# Patient Record
Sex: MALE | Race: WHITE
[De-identification: names, ages, dates, MRNs, and addresses within clinical notes are randomized per-mention and may not be internally consistent; named-entity substitution may affect disease eponyms.]

---

## 2015-12-17 VITALS
SYSTOLIC BLOOD PRESSURE: 114 MMHG | DIASTOLIC BLOOD PRESSURE: 59 MMHG | DIASTOLIC BLOOD PRESSURE: 59 MMHG | SYSTOLIC BLOOD PRESSURE: 114 MMHG

## 2017-04-06 ENCOUNTER — HOSPITAL ENCOUNTER (OUTPATIENT)
Dept: HOSPITAL 61 - KCIC MRI | Age: 62
Discharge: HOME | End: 2017-04-06
Attending: NEUROLOGICAL SURGERY
Payer: COMMERCIAL

## 2017-04-06 DIAGNOSIS — M48.06: Primary | ICD-10-CM

## 2017-04-06 DIAGNOSIS — M54.06: ICD-10-CM

## 2017-04-06 DIAGNOSIS — M51.37: ICD-10-CM

## 2017-04-06 DIAGNOSIS — M41.86: ICD-10-CM

## 2017-04-06 PROCEDURE — 72158 MRI LUMBAR SPINE W/O & W/DYE: CPT

## 2017-04-06 PROCEDURE — 72110 X-RAY EXAM L-2 SPINE 4/>VWS: CPT

## 2017-04-06 NOTE — [facility]
PROCEDURE 

Lumbar radiographs. 

 

HISTORY 

Lumbar stenosis, radiculopathy, previous surgeries 

 

COMPARISON 

March 7, 2016 

 

FINDINGS 

Five views of lumbar spine to include flexion, neutral, and extension 

lateral radiographs are submitted. As seen previously, there has been 

posterolateral fusion at L4-5, intact bilateral pedicle screws attached to

vertical rods, L4-5 interbody spacer also present. There is again mild to 

moderate levoscoliosis centered upon the mid lumbar spine. Lumbar 

vertebral body stature is unchanged. There is advanced degenerative disc 

disease L5-S1 and L2-3, to a lesser degree at L3-4. There is multilevel 

lumbar spondylosis. There is again grade 1 anterior spondylolisthesis at 

L4-5, somewhat reduced with extension compared with flexion. There is 

minimal posterior subluxation L2 relative to L3, very slightly decreased 

with flexion. There is multilevel lumbar facet degenerative change. 

 

IMPRESSION 

1. There is intact posterolateral fusion hardware L4-5. Grade 1 anterior 

spondylolisthesis at L4-5 somewhat reduces with extension compared with 

flexion. Minimal posterior subluxation L2 relative to L3 slightly 

decreases with flexion. There is again advanced degenerative disc disease 

L5-S1 and L2-3 and to lesser degree L3-4. There is multilevel spondylosis.

There is lumbar levoscoliosis.

 

 

 

Electronically signed by: Félix Bowling MD (Apr 06, 2017 12:29:43)

## 2017-04-06 NOTE — [facility]
PROCEDURE 

MRI lumbar spine without and with contrast. 

 

HISTORY 

Lumbar stenosis, radiculopathy, previous surgeries, low back pain, 

symptoms for about 2 months 

 

TECHNIQUE 

Multiplanar, multi sequential pre and post contrast MR imaging was 

performed of the lumbar spine. 

Contrast: 12 cc Gadavist 

 

COMPARISON 

November 19, 2015 

 

FINDINGS 

There is some motion degradation. There is again grade 1 anterior 

spondylolisthesis at what is considered L4-5, negligible posterior 

subluxation L2 relative to L3. Conus terminates at L1. There is no nodular

enhancement of the conus or cauda equina. There has been interval 

posterolateral fusion at L4-L5, bilateral pedicle screws attached to 

vertical rods and also interbody graft at L4-5. Exam does not accurately 

evaluate integrity of hardware. Interbody fusion at L4-5 is not apparent. 

There is again advanced degenerative disc disease at L5-S1 and L2-3, to a 

lesser degree L3-4 and minimally at L1-2. There is mild levoscoliosis 

centered about L2-3. 

Not fully included, there is a large exophytic focus of signal abnormality

arising from the left kidney up to at least 8.5 cm. 

L1-L2: There is again negligible disc osteophyte complex and bulge. There 

is mild facet hypertrophic change. Spinal canal and neural foramina are 

adequate. 

L2-3: There is again disc osteophyte complex and bulge superimposed on the

posteriorly subluxed L2 vertebral body margin. There is indentation upon 

the ventral thecal sac greater in the right lateral recess as seen 

previously. There is again mild to moderate facet degenerative change 

greater on the right, also buckling of the ligamentum flavum greater on 

the right. There is again moderate narrowing of the far right lateral 

recess, mild narrowing of the far left lateral recess. There is contact of

the descending right L3 nerve root in the right lateral recess. There is 

mild left and mild to moderate right neural foramina compromise. 

L3-4: There is again right laminectomy defect. There is buckling of the 

ligamentum flavum on the left. There is very minimal disc osteophyte 

complex and shallow central protrusion as seen previously. There is very 

mild narrowing of the far lateral recesses seen previously. There is very 

mild narrowing of the left neural foramen, right neural foramen adequate. 

L4-5: There is facet hypertrophic change. Spinal canal is adequate. There 

is again right laminectomy defect. Accurate evaluation of the neural 

foramina is limited due to artifact from the hardware specially on the 

right. Left neural foramina is adequate, likely at least mild to moderate 

narrowing of the right neural foramen although poorly characterized due to

artifact. 

L5-S1: There is mild bilateral facet hypertrophic change. There is again 

posterior protrusion greatest centrally without significant neural 

impingement. There is no new significant displacement of the descending S1

nerve roots. Spinal canal is overall adequate. Neural foramina are overall

adequate. 

 

IMPRESSION 

1. Comparing with the November 2015 exam, there has been posterolateral 

fusion at L4-5 at which there is grade 1 anterior spondylolisthesis. There

is similar moderate right and mild left lateral recess stenosis at L2-3, 

contact of the descending right L3 nerve root. There is again advanced 

degenerative disc disease at L2-3 and L5-S1 and to a lesser degree at 

other levels.

2. There is neural foramina compromise as stated greatest on the right at 

L4-5 and L2-3.

3. There is lumbar levoscoliosis.

4. Not fully included, there is a large exophytic focus of signal 

abnormality arising from the left kidney, statistically most likely a cyst

although would be more definitively characterized with ultrasound.

 

 

 

Electronically signed by: Félix Bowling MD (Apr 06, 2017 12:01:37)

## 2017-05-17 ENCOUNTER — HOSPITAL ENCOUNTER (OUTPATIENT)
Dept: HOSPITAL 61 - SURGPAT | Age: 62
Discharge: HOME | End: 2017-05-17
Attending: NEUROLOGICAL SURGERY
Payer: COMMERCIAL

## 2017-05-17 DIAGNOSIS — Z01.818: Primary | ICD-10-CM

## 2017-05-17 LAB
ALBUMIN SERPL-MCNC: 3.6 G/DL (ref 3.4–5)
ALBUMIN/GLOB SERPL: 0.9 {RATIO} (ref 1–1.7)
ALP SERPL-CCNC: 55 U/L (ref 46–116)
ALT SERPL-CCNC: 28 U/L (ref 16–63)
ANION GAP SERPL CALC-SCNC: 5 MMOL/L (ref 6–14)
APTT BLD: 26 SEC (ref 24–38)
AST SERPL-CCNC: 16 U/L (ref 15–37)
BASOPHILS # BLD AUTO: 0.1 X10^3/UL (ref 0–0.2)
BASOPHILS NFR BLD: 1 % (ref 0–3)
BILIRUB SERPL-MCNC: 0.5 MG/DL (ref 0.2–1)
BUN SERPL-MCNC: 14 MG/DL (ref 8–26)
BUN/CREAT SERPL: 16 (ref 6–20)
CALCIUM SERPL-MCNC: 9.5 MG/DL (ref 8.5–10.1)
CHLORIDE SERPL-SCNC: 105 MMOL/L (ref 98–107)
CO2 SERPL-SCNC: 32 MMOL/L (ref 21–32)
CREAT SERPL-MCNC: 0.9 MG/DL (ref 0.7–1.3)
EOSINOPHIL NFR BLD: 4 % (ref 0–3)
ERYTHROCYTE [DISTWIDTH] IN BLOOD BY AUTOMATED COUNT: 13.6 % (ref 11.5–14.5)
GFR SERPLBLD BASED ON 1.73 SQ M-ARVRAT: 85.5 ML/MIN
GLOBULIN SER-MCNC: 3.8 G/DL (ref 2.2–3.8)
GLUCOSE SERPL-MCNC: 123 MG/DL (ref 70–99)
HCT VFR BLD CALC: 41.3 % (ref 39–53)
HGB BLD-MCNC: 14.2 G/DL (ref 13–17.5)
INR PPP: 1.2 (ref 0.8–1.1)
LYMPHOCYTES # BLD: 2.4 X10^3/UL (ref 1–4.8)
LYMPHOCYTES NFR BLD AUTO: 35 % (ref 24–48)
MCH RBC QN AUTO: 31 PG (ref 25–35)
MCHC RBC AUTO-ENTMCNC: 34 G/DL (ref 31–37)
MCV RBC AUTO: 89 FL (ref 79–100)
MONOCYTES NFR BLD: 13 % (ref 0–9)
NEUTROPHILS NFR BLD AUTO: 47 % (ref 31–73)
PLATELET # BLD AUTO: 195 X10^3/UL (ref 140–400)
POTASSIUM SERPL-SCNC: 4.2 MMOL/L (ref 3.5–5.1)
PROT SERPL-MCNC: 7.4 G/DL (ref 6.4–8.2)
PROTHROMBIN TIME: 14 SEC (ref 11.7–14)
RBC # BLD AUTO: 4.64 X10^6/UL (ref 4.3–5.7)
SODIUM SERPL-SCNC: 142 MMOL/L (ref 136–145)
WBC # BLD AUTO: 7 X10^3/UL (ref 4–11)

## 2017-05-17 PROCEDURE — 85027 COMPLETE CBC AUTOMATED: CPT

## 2017-05-17 PROCEDURE — 80053 COMPREHEN METABOLIC PANEL: CPT

## 2017-05-17 PROCEDURE — 85730 THROMBOPLASTIN TIME PARTIAL: CPT

## 2017-05-17 PROCEDURE — 85610 PROTHROMBIN TIME: CPT

## 2017-05-17 PROCEDURE — 93005 ELECTROCARDIOGRAM TRACING: CPT

## 2017-05-17 PROCEDURE — 87641 MR-STAPH DNA AMP PROBE: CPT

## 2017-05-17 PROCEDURE — 71020: CPT

## 2017-05-17 PROCEDURE — 36415 COLL VENOUS BLD VENIPUNCTURE: CPT

## 2017-05-17 NOTE — EKG
[facility]

               [address] [place] [address]

Test Date:    2017               Test Time:    14:50:44

Pat Name:     KENDY ALEX           Department:   

Patient ID:   [id number]           Room:          

Gender:       M                        Technician:   VAZQUEZ

:          1955               Requested By: LEILA LAYNE

Order Number: [id number]            Reading MD:     

                                 Measurements

Intervals                              Axis          

Rate:         66                       P:            47

DE:           210                      QRS:          12

QRSD:         86                       T:            24

QT:           398                                    

QTc:          419                                    

                           Interpretive Statements

SINUS RHYTHM

VENTRICULAR PREMATURE COMPLEX(ES)

ABNORMAL ECG

RI6.01

Compared to ECG 2015 14:10:28

First degree AV block no longer present

## 2017-05-17 NOTE — RAD
Chest, 2 views, 5/17/2017:



History: Preop evaluation for lumbar surgery.



The heart size and pulmonary vascularity are normal. No pulmonary infiltrates

are seen. There is no evidence of pleural fluid. Moderate hypertrophic

spurring is present in the spine. A surgical plate and screws is evident in

the lower cervical region.



IMPRESSION: No acute cardiopulmonary abnormality is detected.

## 2017-05-18 NOTE — HP
ADMIT DATE:  05/24/2017



DATE OF SURGERY:  05/24/2017



HISTORY OF PRESENT ILLNESS:  The patient is a pleasant 61-year-old who has

undergone 2 surgeries of the lumbar spine as well as 2 previous cervical

surgeries.  Currently, he is experiencing low back pain.  He underwent surgery

on his lumbar spine most recently in 12/2015.  Over the last 6 months, though he

has had increasing lower back pain.  The pain radiates from his back into his

posterior thigh and leg on the right side.  He also notices difficulty with

standing for any length of time because of increased pain and feelings of

weakness in both of his legs.  He rates his pain as a 5 out of 10.  Lying down,

standing, walking, sitting in a recliner, and bending makes the problems worse. 

Sitting in a straight back chair helps.  He has difficulty sleeping at night and

frequently must get up at night and sit in a straight back chair to help his

back pain.  He takes tramadol, Nucynta, and Norco.  He has had epidural steroid

injections without benefit.



PAST MEDICAL HISTORY:  Arthritis, artificial joints in bilateral knees,

diabetes, head/neck injury, hypertension, and kidney stones.



PAST SURGICAL HISTORY:  Gastric sleeve bariatric surgery in 2013, total right

knee replacement in 2012, ACDF in 2005, total knee replacement in 2004,

bilateral clavicle reductions in 1990, lumbar microdecompression at L3-L4, L4-L5

in December 2014, and lumbar fusion at L4-L5 in December 2015.



SOCIAL HISTORY:  Retired.  .  Exercises daily.  Quit smoking greater than

one year ago.  He drinks alcohol one to two times per month.



ALLERGIES:  MORPHINE.



CURRENT MEDICATIONS:  Requip, atorvastatin, Proscar, trazodone, ____ ,

omeprazole, fluticasone propionate, Zyrtec, Tylenol, gabapentin, metoprolol,

tramadol, Nucynta, and Norco.



REVIEW OF SYSTEMS:  A 12-point review of systems was obtained and is

noncontributory except that mentioned above.



PHYSICAL EXAMINATION:

NEUROSURGERY EXAMINATION:

GENERAL APPEARANCE:  Alert, pleasant, no acute distress.

HEAD:  Normocephalic, atraumatic.

SKIN:  Warm and dry.  Well-healed lumbar incisions.

MUSCULOSKELETAL:  Lumbar paraspinal muscle bulk is normal, restricted range of

motion of the lumbar spine, marked tenderness of lower lumbar spine with

palpation, and normal range of motion of the lower extremities bilaterally.

EXTREMITIES:  No clubbing, cyanosis, or edema.

NEUROLOGIC:  Alert and oriented x3, normal recent and remote memory, strength

5/5 in bilateral lower extremities, sensory was intact to light touch in the

lower extremities bilaterally, reflexes were absent in bilateral lower

extremities, negative straight leg raising bilaterally, antalgic gait.



IMAGING REVIEWED:  I reviewed a lumbar MRI scan as well as flexion and extension

x-rays.  On the flexion and extension films, there is motion at L4-L5 where he

has undergone a previous instrumentation.  On the MRI scan, he does have

significant spurring above the L4-L5 level and has mild scoliosis centered at

L2-L3.  Additionally, at L2-L3, there is disk osteophyte complex which markedly

narrows the lateral recess in right side of the canal and is associated with

moderately severe spinal stenosis.



ASSESSMENT:

1.  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

2.  Spinal stenosis, lumbar region.

3.  Radiculopathy, lumbar region.



PLAN:  He has had pseudoarthrosis at L4-L5 and will require removal and

replacement of hardware and revision of his fusion.  In the setting, I would

perform microdecompression surgery at L2-L3 on the right.  I discussed all of

this with him in detail including the risks and expected postoperative course. 

He understands and would like to proceed.  We will make the arrangements.

 



______________________________

LEILA LAYNE MD



DR:  JEAN-PAUL/keara  JOB#:  [id number] / [id number]

DD:  05/18/2017 15:01  DT:  05/18/2017 19:23

## 2017-05-24 ENCOUNTER — HOSPITAL ENCOUNTER (INPATIENT)
Dept: HOSPITAL 61 - OPSVCIP | Age: 62
LOS: 1 days | Discharge: HOME | DRG: 460 | End: 2017-05-25
Attending: NEUROLOGICAL SURGERY | Admitting: NEUROLOGICAL SURGERY
Payer: COMMERCIAL

## 2017-05-24 VITALS — DIASTOLIC BLOOD PRESSURE: 72 MMHG | SYSTOLIC BLOOD PRESSURE: 124 MMHG

## 2017-05-24 VITALS — WEIGHT: 240 LBS | HEIGHT: 63 IN | BODY MASS INDEX: 42.52 KG/M2

## 2017-05-24 VITALS — DIASTOLIC BLOOD PRESSURE: 84 MMHG | SYSTOLIC BLOOD PRESSURE: 184 MMHG

## 2017-05-24 VITALS — DIASTOLIC BLOOD PRESSURE: 78 MMHG | SYSTOLIC BLOOD PRESSURE: 176 MMHG

## 2017-05-24 VITALS — DIASTOLIC BLOOD PRESSURE: 74 MMHG | SYSTOLIC BLOOD PRESSURE: 126 MMHG

## 2017-05-24 VITALS — DIASTOLIC BLOOD PRESSURE: 66 MMHG | SYSTOLIC BLOOD PRESSURE: 130 MMHG

## 2017-05-24 VITALS — SYSTOLIC BLOOD PRESSURE: 123 MMHG | DIASTOLIC BLOOD PRESSURE: 76 MMHG

## 2017-05-24 VITALS — DIASTOLIC BLOOD PRESSURE: 71 MMHG | SYSTOLIC BLOOD PRESSURE: 120 MMHG

## 2017-05-24 VITALS — DIASTOLIC BLOOD PRESSURE: 69 MMHG | SYSTOLIC BLOOD PRESSURE: 124 MMHG

## 2017-05-24 VITALS — DIASTOLIC BLOOD PRESSURE: 85 MMHG | SYSTOLIC BLOOD PRESSURE: 122 MMHG

## 2017-05-24 VITALS — SYSTOLIC BLOOD PRESSURE: 128 MMHG | DIASTOLIC BLOOD PRESSURE: 63 MMHG

## 2017-05-24 DIAGNOSIS — M43.16: ICD-10-CM

## 2017-05-24 DIAGNOSIS — Z98.84: ICD-10-CM

## 2017-05-24 DIAGNOSIS — Z87.442: ICD-10-CM

## 2017-05-24 DIAGNOSIS — Z96.651: ICD-10-CM

## 2017-05-24 DIAGNOSIS — M96.0: ICD-10-CM

## 2017-05-24 DIAGNOSIS — M48.06: Primary | ICD-10-CM

## 2017-05-24 DIAGNOSIS — Z87.891: ICD-10-CM

## 2017-05-24 DIAGNOSIS — M51.16: ICD-10-CM

## 2017-05-24 DIAGNOSIS — M19.90: ICD-10-CM

## 2017-05-24 DIAGNOSIS — M54.16: ICD-10-CM

## 2017-05-24 DIAGNOSIS — I10: ICD-10-CM

## 2017-05-24 DIAGNOSIS — E11.9: ICD-10-CM

## 2017-05-24 PROCEDURE — 86901 BLOOD TYPING SEROLOGIC RH(D): CPT

## 2017-05-24 PROCEDURE — 86900 BLOOD TYPING SEROLOGIC ABO: CPT

## 2017-05-24 PROCEDURE — 0SG00K1 FUSION OF LUMBAR VERTEBRAL JOINT WITH NONAUTOLOGOUS TISSUE SUBSTITUTE, POSTERIOR APPROACH, POSTERIOR COLUMN, OPEN APPROACH: ICD-10-PCS | Performed by: NEUROLOGICAL SURGERY

## 2017-05-24 PROCEDURE — 72131 CT LUMBAR SPINE W/O DYE: CPT

## 2017-05-24 PROCEDURE — 36415 COLL VENOUS BLD VENIPUNCTURE: CPT

## 2017-05-24 PROCEDURE — 76000 FLUOROSCOPY <1 HR PHYS/QHP: CPT

## 2017-05-24 PROCEDURE — C1713 ANCHOR/SCREW BN/BN,TIS/BN: HCPCS

## 2017-05-24 PROCEDURE — 86850 RBC ANTIBODY SCREEN: CPT

## 2017-05-24 PROCEDURE — 0SB20ZZ EXCISION OF LUMBAR VERTEBRAL DISC, OPEN APPROACH: ICD-10-PCS | Performed by: NEUROLOGICAL SURGERY

## 2017-05-24 PROCEDURE — 99406 BEHAV CHNG SMOKING 3-10 MIN: CPT

## 2017-05-24 RX ADMIN — METHOCARBAMOL SCH MG: 750 TABLET ORAL at 21:13

## 2017-05-24 RX ADMIN — DOCUSATE SODIUM SCH MG: 100 CAPSULE, LIQUID FILLED ORAL at 21:13

## 2017-05-24 RX ADMIN — FENTANYL CITRATE PRN MCG: 50 INJECTION INTRAMUSCULAR; INTRAVENOUS at 17:05

## 2017-05-24 RX ADMIN — ROPINIROLE HYDROCHLORIDE SCH MG: 1 TABLET, FILM COATED ORAL at 21:13

## 2017-05-24 RX ADMIN — GABAPENTIN SCH MG: 300 CAPSULE ORAL at 21:13

## 2017-05-24 RX ADMIN — FENTANYL CITRATE PRN MCG: 50 INJECTION INTRAMUSCULAR; INTRAVENOUS at 16:41

## 2017-05-24 RX ADMIN — DOCUSATE SODIUM SCH MG: 100 CAPSULE, LIQUID FILLED ORAL at 21:00

## 2017-05-24 RX ADMIN — OXYCODONE HYDROCHLORIDE AND ACETAMINOPHEN PRN TAB: 5; 325 TABLET ORAL at 19:43

## 2017-05-24 NOTE — RAD
CT of the lumbar spine without contrast, 5/24/2017:



History: Radiculopathy, pseudarthrosis, stenosis, brain lab study



Noncontrast scans were obtained with multiplanar reconstructions produced. The

data was transferred to the operating room to aid in the patient's

stereotactically guided surgery. The following findings are delineated:



1. There is a mild left convexity lumbar scoliosis. There is considerable

degenerative disc disease throughout the lumbar spine. There are bilateral

pedicle screws at L4 and L5 attached to posterior fixation rods. There is a

partially radiopaque disc spacer at the L4-5 level. Associated artifacts

degrade image quality in the lower lumbar spine.



2. There is a mild spondylolisthesis at L4-5. There has been a laminectomy on

the right. The thecal sac at this level is poorly defined due to artifacts.

There appears be mild narrowing of the central spinal canal as well as

moderate right foraminal encroachment.



3. At L1-2 there are mild degenerative changes involving the facet joints.

There is disc space narrowing with mild posterior spurring and disc bulging.

The central spinal canal and neural foramina are fairly well-maintained.



4. At L2-3 there is severe degenerative disc disease with prominent posterior

spurs. There is widening of the facet joints with a mild reverse

spondylolisthesis. The combination of findings is causing mild to moderate

central spinal stenosis and moderate right foraminal encroachment.



5. At L3-4 there is moderate disc space narrowing and posterior marginal

spurring. A laminectomy defect is present on the right. The thecal sac is not

clearly defined due to artifacts. There appears to be mild narrowing of the

left side of the thecal sac at this level and mild left foraminal

encroachment.



6. At L5-S1 there is moderate disc space narrowing and posterior marginal

spurring, more so on the left. There is mild associated encroachment upon the

left lateral recess of S1. There is mild to moderate bilateral foraminal

encroachment. There are mild degenerative changes involving the facet joints.











PQRS Compliance Statement:



One or more of the following individualized dose reduction techniques were

utilized for this examination:

1. Automated exposure control

2. Adjustment of the mA and/or kV according to patient size

3. Use of iterative reconstruction technique

## 2017-05-24 NOTE — HP
ADMIT DATE:  05/24/2017



DATE OF SURGERY:  05/24/2017



HISTORY OF PRESENT ILLNESS:  The patient is a pleasant 61-year-old who has

undergone 2 surgeries of the lumbar spine as well as 2 previous cervical

surgeries.  Currently, he is experiencing low back pain.  He underwent surgery

on his lumbar spine most recently in 12/2015.  Over the last 6 months, though he

has had increasing lower back pain.  The pain radiates from his back into his

posterior thigh and leg on the right side.  He also notices difficulty with

standing for any length of time because of increased pain and feelings of

weakness in both of his legs.  He rates his pain as a 5 out of 10.  Lying down,

standing, walking, sitting in a recliner, and bending makes the problems worse. 

Sitting in a straight back chair helps.  He has difficulty sleeping at night and

frequently must get up at night and sit in a straight back chair to help his

back pain.  He takes tramadol, Nucynta, and Norco.  He has had epidural steroid

injections without benefit.



PAST MEDICAL HISTORY:  Arthritis, artificial joints in bilateral knees,

diabetes, head/neck injury, hypertension, and kidney stones.



PAST SURGICAL HISTORY:  Gastric sleeve bariatric surgery in 2013, total right

knee replacement in 2012, ACDF in 2005, total knee replacement in 2004,

bilateral clavicle reductions in 1990, lumbar microdecompression at L3-L4, L4-L5

in December 2014, and lumbar fusion at L4-L5 in December 2015.



SOCIAL HISTORY:  Retired.  .  Exercises daily.  Quit smoking greater than

one year ago.  He drinks alcohol one to two times per month.



ALLERGIES:  MORPHINE.



CURRENT MEDICATIONS:  Requip, atorvastatin, Proscar, trazodone, ____ ,

omeprazole, fluticasone propionate, Zyrtec, Tylenol, gabapentin, metoprolol,

tramadol, Nucynta, and Norco.



REVIEW OF SYSTEMS:  A 12-point review of systems was obtained and is

noncontributory except that mentioned above.



PHYSICAL EXAMINATION:

NEUROSURGERY EXAMINATION:

GENERAL APPEARANCE:  Alert, pleasant, no acute distress.

HEAD:  Normocephalic, atraumatic.

SKIN:  Warm and dry.  Well-healed lumbar incisions.

MUSCULOSKELETAL:  Lumbar paraspinal muscle bulk is normal, restricted range of

motion of the lumbar spine, marked tenderness of lower lumbar spine with

palpation, and normal range of motion of the lower extremities bilaterally.

EXTREMITIES:  No clubbing, cyanosis, or edema.

NEUROLOGIC:  Alert and oriented x3, normal recent and remote memory, strength

5/5 in bilateral lower extremities, sensory was intact to light touch in the

lower extremities bilaterally, reflexes were absent in bilateral lower

extremities, negative straight leg raising bilaterally, antalgic gait.



IMAGING REVIEWED:  I reviewed a lumbar MRI scan as well as flexion and extension

x-rays.  On the flexion and extension films, there is motion at L4-L5 where he

has undergone a previous instrumentation.  On the MRI scan, he does have

significant spurring above the L4-L5 level and has mild scoliosis centered at

L2-L3.  Additionally, at L2-L3, there is disk osteophyte complex which markedly

narrows the lateral recess in right side of the canal and is associated with

moderately severe spinal stenosis.



ASSESSMENT:

1.  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

2.  Spinal stenosis, lumbar region.

3.  Radiculopathy, lumbar region.



PLAN:  He has had pseudoarthrosis at L4-L5 and will require removal and

replacement of hardware and revision of his fusion.  In the setting, I would

perform microdecompression surgery at L2-L3 on the right.  I discussed all of

this with him in detail including the risks and expected postoperative course. 

He understands and would like to proceed.  We will make the arrangements.

 



______________________________

LEILA LAYNE MD



DR:  JEAN-PAUL/keara  JOB#:  [id number] / [id number]

DD:  05/18/2017 15:01  DT:  05/18/2017 19:23

## 2017-05-25 VITALS — SYSTOLIC BLOOD PRESSURE: 118 MMHG | DIASTOLIC BLOOD PRESSURE: 74 MMHG

## 2017-05-25 VITALS
DIASTOLIC BLOOD PRESSURE: 69 MMHG | SYSTOLIC BLOOD PRESSURE: 128 MMHG | SYSTOLIC BLOOD PRESSURE: 128 MMHG | DIASTOLIC BLOOD PRESSURE: 69 MMHG | SYSTOLIC BLOOD PRESSURE: 128 MMHG | DIASTOLIC BLOOD PRESSURE: 69 MMHG

## 2017-05-25 VITALS — DIASTOLIC BLOOD PRESSURE: 69 MMHG | SYSTOLIC BLOOD PRESSURE: 128 MMHG

## 2017-05-25 VITALS — DIASTOLIC BLOOD PRESSURE: 68 MMHG | SYSTOLIC BLOOD PRESSURE: 118 MMHG

## 2017-05-25 RX ADMIN — ROPINIROLE HYDROCHLORIDE SCH MG: 1 TABLET, FILM COATED ORAL at 07:58

## 2017-05-25 RX ADMIN — DOCUSATE SODIUM SCH MG: 100 CAPSULE, LIQUID FILLED ORAL at 07:59

## 2017-05-25 RX ADMIN — METHOCARBAMOL SCH MG: 750 TABLET ORAL at 07:58

## 2017-05-25 RX ADMIN — OXYCODONE HYDROCHLORIDE AND ACETAMINOPHEN PRN TAB: 5; 325 TABLET ORAL at 11:54

## 2017-05-25 RX ADMIN — GABAPENTIN SCH MG: 300 CAPSULE ORAL at 07:57

## 2017-05-25 RX ADMIN — OXYCODONE HYDROCHLORIDE AND ACETAMINOPHEN PRN TAB: 5; 325 TABLET ORAL at 08:08

## 2017-05-25 NOTE — DISCH
DISCHARGE INSTRUCTIONS


Condition on Discharge


Condition on Discharge:  Stable





Activity After Discharge


Activity Instructions for Disc:  Activity as tolerated, Avoid exertion


Bathing Instructions:  Shower-keep dressing dry


Lifting Instructions after Dis:  No heavy lifting, No pulling or pushing, Do 

not lift >10 pounds


Driving Instructions after Dis:  No driving for 2 weeks





Diet after Discharge


Additional Diet Restrictions:  resume home diet





Wound Incision Care


Wound/Incision Care:  Ice to area for comfort


Other wound/incision instructi:  may remove dressing in 48hrs if dry then may 

shower- no soaking





Contacting the  after DC


Call your doctor for:  Concerns you may have





Follow-Up


Follow up with:  Dr. Layne's nurse [phone]











LEILA LAYNE MD May 25, 2017 11:17

## 2017-05-25 NOTE — PDOC
PROGRESS NOTES


Subjective


Subjective


POD #1


up in chair


feels great


leg pain gone


wants to go home





Objective


Objective





Vital Signs








  Date Time  Temp Pulse Resp B/P (MAP) Pulse Ox O2 Delivery O2 Flow Rate FiO2


 


5/25/17 11:17 98.3 78 18 128/69 (88) 93 Room Air  





 98.3       


 


5/24/17 20:45       10.0 














Intake and Output 


 


 5/25/17





 07:00


 


Intake Total 3380 ml


 


Output Total 1900 ml


 


Balance 1480 ml


 


 


 


Intake Oral 1180 ml


 


IV Total 2200 ml


 


Output Urine Total 1650 ml


 


Estimated Blood Loss 250 ml


 


# Voids 2











Physical Exam


General:  Alert, Oriented X3, Cooperative, No acute distress


MUSCULOSKELETAL:  Other (SOSA)


Neuro:  Normal speech


Skin:  Other (dressing C,D,I, flat)





Plan


Plan of Care


dc home


f/u 2 weeks





Comment


Review of Relevant


I have reviewed the following items [place] (where applicable) has been applied.


Medications





Current Medications


Bacitracin 71834 unit/Sodium Chloride 1,000 ml @  1,000 mls/hr 1X PERIOP  ONCE 

IRR  Last administered on 5/24/17at 11:44;  Start 5/24/17 at 06:00;  Stop 5/24/ 17 at 06:59;  Status DC


Ondansetron HCl (Zofran) 4 mg PRN Q6HRS  PRN IV NAUSEA/VOMITING;  Start 5/24/17 

at 07:00;  Stop 5/25/17 at 06:59;  Status DC


Fentanyl Citrate (Fentanyl 2ml Vial) 25 mcg PRN Q5MIN  PRN IV MILD PAIN;  Start 

5/24/17 at 07:00;  Stop 5/25/17 at 06:59;  Status DC


Fentanyl Citrate (Fentanyl 2ml Vial) 50 mcg PRN Q5MIN  PRN IV MODERATE PAIN 

Last administered on 5/24/17at 17:05;  Start 5/24/17 at 07:00;  Stop 5/25/17 at 

06:59;  Status DC


Ringer's Solution 1,000 ml @  30 mls/hr Q24H IV ;  Start 5/24/17 at 07:00;  

Stop 5/24/17 at 18:59;  Status DC


Lidocaine HCl 2 ml PRN 1X  PRN ID PRIOR TO IV START;  Start 5/24/17 at 07:00;  

Stop 5/25/17 at 06:59;  Status DC


Prochlorperazine Edisylate (Compazine) 5 mg PACU PRN  PRN IV NAUSEA, MRX1;  

Start 5/24/17 at 07:00;  Stop 5/25/17 at 06:59;  Status DC


Cefazolin Sodium/ Dextrose 50 ml @  100 mls/hr 1X PREOP  PRN IV Prior to 

surgery Last administered on 5/24/17at 11:32;  Start 5/24/17 at 08:00


Bupivacaine HCl/ Epinephrine Bitart (Sensorcain-Mpf Epi 0.5%-1:[id number]) 30 ml STK

-MED ONCE .ROUTE  Last administered on 5/24/17at 11:44;  Start 5/24/17 at 07:20

;  Stop 5/24/17 at 07:21;  Status DC


Gelatin (Gelfoam  Size 100) 1 each STK-MED ONCE .ROUTE  Last administered on 5/ 24/17at 11:44;  Start 5/24/17 at 07:20;  Stop 5/24/17 at 07:21;  Status DC


Ketorolac Tromethamine (Toradol For Or Only) 60 mg STK-MED ONCE .ROUTE  Last 

administered on 5/24/17at 11:44;  Start 5/24/17 at 07:21;  Stop 5/24/17 at 07:22

;  Status DC


Thrombin 20,000 unit STK-MED ONCE TP  Last administered on 5/24/17at 11:44;  

Start 5/24/17 at 07:21;  Stop 5/24/17 at 07:22;  Status DC


Lidocaine HCl (Lidocaine Pf 2% Vial) 5 ml STK-MED ONCE .ROUTE ;  Start 5/24/17 

at 10:26;  Stop 5/24/17 at 10:27;  Status DC


Propofol 20 ml @ As Directed STK-MED ONCE IV ;  Start 5/24/17 at 10:26;  Stop 5/ 24/17 at 10:27;  Status DC


Midazolam HCl (Versed) 2 mg STK-MED ONCE .ROUTE ;  Start 5/24/17 at 10:26;  

Stop 5/24/17 at 10:27;  Status DC


Fentanyl Citrate (Fentanyl 2ml Vial) 100 mcg STK-MED ONCE .ROUTE ;  Start 5/24/ 17 at 10:26;  Stop 5/24/17 at 10:27;  Status DC


Rocuronium Bromide (Zemuron) 50 mg STK-MED ONCE .ROUTE ;  Start 5/24/17 at 10:26

;  Stop 5/24/17 at 10:27;  Status DC


Multi-Ingred Cream/Lotion/Oil/ Oint (Artificial Tears Eye Oint) 7 kevin STK-MED 

ONCE .ROUTE ;  Start 5/24/17 at 10:34;  Stop 5/24/17 at 10:35;  Status DC


Remifentanil HCl (Ultiva) 2 mg STK-MED ONCE IV ;  Start 5/24/17 at 10:44;  Stop 

5/24/17 at 10:45;  Status DC


Propofol 50 ml @ As Directed STK-MED ONCE IV ;  Start 5/24/17 at 10:44;  Stop 5/ 24/17 at 10:45;  Status DC


Ondansetron HCl (Zofran) 4 mg STK-MED ONCE .ROUTE ;  Start 5/24/17 at 11:03;  

Stop 5/24/17 at 11:04;  Status DC


Dexamethasone Sodium Phosphate (Decadron) 20 mg STK-MED ONCE .ROUTE ;  Start 5/ 24/17 at 11:03;  Stop 5/24/17 at 11:04;  Status DC


Ephedrine Sulfate 50 mg STK-MED ONCE IV ;  Start 5/24/17 at 11:31;  Stop 5/24/ 17 at 11:32;  Status DC


Desflurane (Suprane) 90 ml STK-MED ONCE IH ;  Start 5/24/17 at 12:17;  Stop 5/24 /17 at 12:18;  Status DC


Phenylephrine HCl 1 mg STK-MED ONCE IV ;  Start 5/24/17 at 12:51;  Stop 5/24/17 

at 12:52;  Status DC


Glycopyrrolate (Robinul) 1 mg STK-MED ONCE .ROUTE ;  Start 5/24/17 at 12:53;  

Stop 5/24/17 at 12:54;  Status DC


Propofol 50 ml @ As Directed STK-MED ONCE IV ;  Start 5/24/17 at 13:10;  Stop 5/ 24/17 at 13:11;  Status DC


Remifentanil HCl (Ultiva) 1 mg STK-MED ONCE IV ;  Start 5/24/17 at 13:50;  Stop 

5/24/17 at 13:51;  Status DC


Remifentanil HCl (Ultiva) 1 mg STK-MED ONCE IV ;  Start 5/24/17 at 14:59;  Stop 

5/24/17 at 15:00;  Status DC


Aspirin (Melissa Aspirin) 325 mg DAILY PO  Last administered on 5/25/17at 07:59;  

Start 5/25/17 at 09:00


Atorvastatin Calcium (Lipitor) 20 mg HS PO  Last administered on 5/24/17at 21:13

;  Start 5/24/17 at 21:00


Docusate Sodium (Colace) 100 mg BID PO  Last administered on 5/25/17at 07:59;  

Start 5/24/17 at 21:00


Docusate Sodium (Colace) 100 mg BID PO  Last administered on 5/25/17at 07:59;  

Start 5/24/17 at 21:00


Finasteride (Proscar) 5 mg DAILY PO ;  Start 5/25/17 at 09:00


Lisinopril (Prinivil) 20 mg DAILY PO ;  Start 5/25/17 at 09:00


Methocarbamol (Robaxin) 750 mg TID PO  Last administered on 5/25/17at 07:58;  

Start 5/24/17 at 21:00


Metoprolol Succinate (Toprol Xl) 25 mg DAILY PO  Last administered on 5/25/17at 

08:01;  Start 5/25/17 at 09:00


Tramadol HCl (Ultram) 50 mg PRN Q6HRS  PRN PO PAIN;  Start 5/24/17 at 15:30


Calcium/Vitamin D (Oscal D 500mg/ 200uts) 1 tab DAILYWBKFT PO  Last 

administered on 5/25/17at 07:59;  Start 5/25/17 at 08:00


Gabapentin (Neurontin) 300 mg TID PO  Last administered on 5/25/17at 07:57;  

Start 5/24/17 at 21:00


Ropinirole HCl (Requip) 2 mg BID PO  Last administered on 5/25/17at 07:58;  

Start 5/24/17 at 21:00


Trazodone HCl (Desyrel) 150 mg HS PO  Last administered on 5/24/17at 21:13;  

Start 5/24/17 at 21:00


Cefazolin Sodium/ Dextrose 50 ml @  100 mls/hr 1X  ONCE IV ;  Start 5/24/17 at 

15:30;  Stop 5/24/17 at 17:31;  Status DC


Acetaminophen (Tylenol) 650 mg PRN Q6HRS  PRN PO MILD PAIN / TEMP;  Start 5/24/ 17 at 15:30


Al Hydroxide/Mg Hydroxide (Mylanta Plus Xs) 30 ml PRN Q3HRS  PRN PO HEARTBURN / 

GAS;  Start 5/24/17 at 15:30


Calcium Carbonate/ Glycine (Tums) 500 mg PRN Q3HRS  PRN PO INDIGESTION;  Start 5 /24/17 at 15:30


Diphenhydramine HCl (Benadryl) 25 mg PRN Q6HRS  PRN PO ITCHING;  Start 5/24/17 

at 15:30


Diphenhydramine HCl (Benadryl) 25 mg PRN Q6HRS  PRN IV ITCHING;  Start 5/24/17 

at 15:30


Sodium Chloride (Normal Saline Flush) 3 ml QSHIFT  PRN IV AFTER MEDS AND BLOOD 

DRAWS;  Start 5/24/17 at 15:30


Potassium Chloride/Sodium Chloride 1,000 ml @  75 mls/hr [id number] IV ;  Start 5/ 24/17 at 15:30


Oxycodone/ Acetaminophen (Percocet 5/325) 1 tab PRN Q4HRS  PRN PO MILD PAIN, 

1ST CHOICE Last administered on 5/25/17at 08:08;  Start 5/24/17 at 15:30


Oxycodone/ Acetaminophen (Percocet 5/325) 2 tab PRN Q4HRS  PRN PO MODERATE PAIN

, SEVERE PAIN Last administered on 5/25/17at 00:45;  Start 5/24/17 at 15:30


Magnesium Hydroxide (Milk Of Magnesia) 2,400 mg PRN Q12HR  PRN PO CONSTIPATION;

  Start 5/24/17 at 15:30


Ondansetron HCl (Zofran) 4 mg PRN Q6HRS  PRN IV NAUESA, 1ST CHOICE;  Start 5/24/ 17 at 15:30


Cefazolin Sodium 1 gm/Sodium Chloride 50 ml @  100 mls/hr Q8HRS IV  Last 

administered on 5/25/17at 06:01;  Start 5/24/17 at 22:00;  Stop 5/25/17 at 14:29


Fentanyl Citrate (Fentanyl 2ml Vial) 50 mcg PRN Q2HR  PRN IV PAIN;  Start 5/24/ 17 at 15:30


Cefazolin Sodium/ Dextrose (Ancef 2gm Premix) 2 gm STK-MED ONCE IV ;  Start 5/24 /17 at 15:00;  Stop 5/25/17 at 09:03;  Status DC





Active Scripts


Active


Colace (Docusate Sodium) 100 Mg Capsule 100 Mg PO BID


Colace (Docusate Sodium) 100 Mg Capsule 100 Mg PO BID


Reported


Tums (Calcium Carbonate) 300 Mg Tab.chew 300 Mg PO 


Tramadol Hcl 50 Mg Tablet 50 Mg PO Q6H PRN


Lisinopril 20 Mg Tablet 20 Mg PO DAILY


Hydrocodone-Apap 7.5-325  ** (Hydrocodone Bit/Acetaminophen) 1 Each Tablet 1 

Tab PO PRN Q6HRS PRN


Robaxin-750 (Methocarbamol) 750 Mg Tablet 1 Tab PO TID


Calcium + D3 Er Tablet (Calcium Carb & Cit/Vitamin D3) 1 Each Tablet.er 1 Each 

PO DAILY


     LAST DOSE GIVEN:


     DATE:12-17-15


     TIME:9:00 a.m.


     NEXT DOSE DUE:


     DATE:12-18-15


     TIME:9:00 a.m.


Gabapentin 300 Mg Capsule 300 Mg PO TID


     LAST DOSE GIVEN:


     DATE:12-17-15


     TIME:9:00 a.m.


     NEXT DOSE DUE:


     DATE:12-17-15


     TIME:2:00 p.m.


Toprol Xl (Metoprolol Succinate) 25 Mg Tab.er.24h 25 Mg PO DAILY


     Not taken while in hosp. May resume at home as directed.


Aspirin 325 Mg Tablet 1 Tab PO DAILY


     LAST DOSE GIVEN:


     DATE:12-17-15


     TIME:9:00 a.m.


     NEXT DOSE DUE:


     DATE:12-18-15


     TIME:9:00 a.m.


Requip (Ropinirole Hcl) 2 Mg Tablet 2 Mg PO BID


     LAST DOSE GIVEN:


     DATE:12-17-15


     TIME:9:00 a.m.


     NEXT DOSE DUE:


     DATE:12-17-15


     TIME:9:00 p.m.


Trazodone Hcl 150 Mg Tablet 1 Tab PO QHS


     LAST DOSE GIVEN:


     DATE:12-16-15


     TIME:9:00 p.m.


     NEXT DOSE DUE:


     DATE:12-17-15


     TIME:9:00 p.m.


Atorvastatin Calcium 20 Mg Tablet 20 Mg PO HS


     LAST DOSE GIVEN:


     DATE:12-16-15


     TIME:9:00 p.m.


     NEXT DOSE DUE:


     DATE:12-17-15


     TIME:9:00 p.m.


Proscar (Finasteride) 5 Mg Tablet 1 Tab PO DAILY


     LAST DOSE GIVEN:


     DATE:12-16-15


     TIME:9:00 p.m.


     NEXT DOSE DUE:


     DATE:12-17-15


     TIME:9:00 p.m.


Vitals/I & O





Vital Sign - Last 24 Hours








 5/24/17 5/24/17 5/24/17 5/24/17





 16:22 16:22 16:37 16:41


 


Temp  96.9  





  96.9  


 


Pulse  96 87 


 


Resp  20 20 20


 


B/P (MAP)  129/40 126/58 


 


Pulse Ox  100 95 99


 


O2 Delivery Mask  Simple Mask Aerosol Mask


 


O2 Flow Rate 10 10 10 10.0


 


    





    





 5/24/17 5/24/17 5/24/17 5/24/17





 17:00 17:05 17:15 17:30


 


Temp 97.0  97.0 98.2





 97.0  97.0 98.2


 


Pulse 88  96 83


 


Resp 20 20 20 18


 


B/P (MAP) 136/64  136/37 128/63 (84)


 


Pulse Ox 96 99 96 97


 


O2 Delivery Room Air Room Air Room Air Room Air


 


    





    





 5/24/17 5/24/17 5/24/17 5/24/17





 17:45 17:52 18:03 18:15


 


Temp    98.3





    98.3


 


Pulse 80  78 74


 


Resp   20 18


 


B/P (MAP) 130/66 (87)  124/69 (87) 120/71 (87)


 


Pulse Ox   97 98


 


O2 Delivery  Room Air Room Air Room Air


 


    





    





 5/24/17 5/24/17 5/24/17 5/24/17





 18:44 19:15 19:43 19:45


 


Pulse 70 91  87


 


Resp 20 18 18 18


 


B/P (MAP) 124/72 (89) 176/78 (110)  184/84 (117)


 


Pulse Ox  96  96


 


O2 Delivery Room Air Room Air Room Air Room Air





 5/24/17 5/24/17 5/24/17 5/24/17





 20:00 20:45 20:45 21:45


 


Pulse   90 85


 


Resp   18 


 


B/P (MAP)   123/76 (92) 122/85 (97)


 


Pulse Ox  96 96 99


 


O2 Delivery Room Air Room Air Room Air Room Air


 


O2 Flow Rate 10.0 10.0  





 5/24/17 5/25/17 5/25/17 5/25/17





 23:10 00:45 01:45 03:00


 


Temp 97.8   97.8





 97.8   97.8


 


Pulse 85   75


 


Resp 16 18 18 18


 


B/P (MAP) 126/74 (91)   118/74 (89)


 


Pulse Ox 94 94 94 96


 


O2 Delivery Room Air BiPAP/CPAP Room Air BiPAP/CPAP


 


    





    





 5/25/17 5/25/17 5/25/17 5/25/17





 06:03 08:01 08:08 08:15


 


Temp 97.8   





 97.8   


 


Pulse 77 91  


 


Resp 18  18 


 


B/P (MAP) 118/68 (85) 121/70  


 


Pulse Ox 98   


 


O2 Delivery BiPAP/CPAP   Room Air


 


    





    





 5/25/17   





 11:17   


 


Temp 98.3   





 98.3   


 


Pulse 78   


 


Resp 18   


 


B/P (MAP) 128/69 (88)   


 


Pulse Ox 93   


 


O2 Delivery Room Air   














Intake and Output   


 


 5/24/17 5/24/17 5/25/17





 15:00 23:00 07:00


 


Intake Total 50 ml 2530 ml 800 ml


 


Output Total  1550 ml 350 ml


 


Balance 50 ml 980 ml 450 ml

















LEILA LAYNE MD May 25, 2017 11:20

## 2017-05-26 NOTE — OP
DATE OF SURGERY:  05/24/2017



PREOPERATIVE DIAGNOSES:

1.  Spondylolisthesis L4-L5 with pseudoarthrosis L4-L5.

2.  Lateral recess stenosis with herniated lumbar disk, L2-L3 right with right

lumbar radiculopathy.



POSTOPERATIVE DIAGNOSES:

1.  Spondylolisthesis L4-L5 with pseudoarthrosis L4-L5.

2.  Lateral recess stenosis with herniated lumbar disk, L2-L3 right with right

lumbar radiculopathy.



OPERATION PERFORMED:

1.  Removal of hardware, L4-L5; posterolateral fusion, L4-L5 with allograft

bone; revision of hardware L4-L5 with re-tapping larger diameter screws and

construct.

2.  Hemilaminotomy with decompression of dura and nerve root and lumbar

microdiskectomy at L2-3, right.  The operation was done with stimulated EMG

monitoring, fluoroscopy, microscopic dissection and BrainLAB guidance.



OPERATIVE INDICATIONS:  The patient is a very pleasant 61-year-old man who

developed back pain along with pain, which was severe, which has radiated into

his right lower extremity.  On imaging studies, he had the above-mentioned

findings.  I recommended revision of his spondylolisthesis with removal and

replacement of hardware combined with a hemilaminotomy and microdiskectomy at

L2-L3, right.  He understood the surgery, the risks, technique and the expected

postoperative course and wished to go ahead.



DESCRIPTION OF PROCEDURE:  Following general endotracheal anesthesia, the

patient was positioned prone on the Chaka table.  His lumbar region was

prepped and draped in the standard fashion.  CHRISSY hose and AV impulse boots were

applied for DVT prophylaxis.  A microscope was draped.  Fluoroscopy was draped

and brought into field.  Monitoring was established.  Ancef 2 gram was given

less than 1 hour prior to initiation of surgery.  Iliac pins were placed in the

left iliac crest and the BrainLAB system was initialized.  I then reopened his

previous lumbar incision, dissected down through skin and subcutaneous tissue,

reflected the skin and subcutaneous tissue and then made an opening in the

lumbar dorsal fashion directly over the hardware at L4-L5 first on the right

side ____ exposed the hardware.  I removed the nuts and then removed the screws

and cornelius assembly on this side, I felt that the fusion was satisfactory, although

tenuous.  I did excoriate the bone and over the transverse processes at L4 and

L5 on the right and then aspirated 20 mL of bone marrow.  I used this and mixed

this with allograft bone, which was then packed into the left lateral gutter. 

The screws were placed and the cornelius was placed, and the system was torqued.  I

then went to the right side in a similar fashion.  Passed on to the lumbodorsal

fascia and exposed the previous hardware.  The nuts were removed and the

interconnecting cornelius was removed.  Clearly, there was a pseudoarthrosis on this

side and I removed the screws.  The L4 screw on the right was largely rounded

out and I tapped and placed a 7.5 screw in this location.  The remaining screws

I placed were 6.5 diameter screws and I used the [employer] system.  I did

excoriate the bone and transverse processes and placed allograft bone in the

lateral gutter after it had been adequately exposed and excoriated.  I then

placed the screws and cornelius assembly and torqued the system.  I then moved

superiorly to L2-L3 on the right and placed a [place] micro disk retractor after

create an exposure, there was considerable scarring present.  I brought in the

microscope and the remainder of surgery done with the microscope using

microscopic technique.  I burred down a generous hemilaminotomy, trimmed away

thickened ligamentum flavum, performed a partial foraminotomy.  The root was

tethered and scarred down on to a herniated lumbar disk and I freed much of

this; however, the disk was heavily calcified except for the superior portion,

which was ____ I made a small incision in the ____ and performed a diskectomy

with micropituitary.  As I worked and fully decompress the region, the nerve

became very free.  I did perform a partial foraminotomy.  At this point, I feel

I had an excellent decompression.  I did use small amounts of bone wax as well

as bipolar cautery for any bleeding.  I removed the retractor, obtained

hemostasis in the muscle, I irrigated the entire wound copiously.  Then, I

closed the wound in layers with absorbable suture and the skin was closed with

skin staples.  The operation went very well and the patient was awakened

uneventfully, taken to recovery room in excellent condition.  I was quite

pleased with the surgery.

 



______________________________

LEILA LAYNE MD



DR:  JEAN-PAUL/keara  JOB#:  [id number] / [id number]

DD:  05/25/2017 19:53  DT:  05/26/2017 01:15

## 2017-08-14 ENCOUNTER — HOSPITAL ENCOUNTER (OUTPATIENT)
Dept: HOSPITAL 61 - KCIC | Age: 62
Discharge: HOME | End: 2017-08-14
Attending: NEUROLOGICAL SURGERY
Payer: COMMERCIAL

## 2017-08-14 DIAGNOSIS — M51.36: Primary | ICD-10-CM

## 2017-08-14 DIAGNOSIS — M41.86: ICD-10-CM

## 2017-08-14 PROCEDURE — 72100 X-RAY EXAM L-S SPINE 2/3 VWS: CPT

## 2017-08-14 NOTE — [facility]
Indication: Lumbar fusion.

 

Time of exam 12:35 PM

 

Comparison is made with prior lumbar radiographs from 4/6/2017.

 

There is mild left convexity lumbar scoliotic curvature. There is normal 

lordotic curvature. Postop changes of posterior instrumented fusion is 

noted with vertical stabilization rods and pedicle screws transfixing the 

L4-5 level. The hardware appears intact. No fracture or loosening is seen.

The vertebral body heights are maintained. No acute compression fracture 

is identified. There is multilevel degenerative disc disease with variable

disc space narrowing and marginal spurring.

 

IMPRESSION: Stable postop changes. No acute feature is detected.

 

Electronically signed by: Aidan Hernández MD (8/14/2017 12:53 PM) [id number]

## 2018-09-26 ENCOUNTER — HOSPITAL ENCOUNTER (OUTPATIENT)
Dept: HOSPITAL 63 - DXRAD | Age: 63
Discharge: HOME | End: 2018-09-26
Attending: PHYSICIAN ASSISTANT
Payer: COMMERCIAL

## 2018-09-26 DIAGNOSIS — M19.012: Primary | ICD-10-CM

## 2018-09-26 DIAGNOSIS — M95.4: ICD-10-CM

## 2018-09-26 PROCEDURE — 73030 X-RAY EXAM OF SHOULDER: CPT

## 2018-09-26 NOTE — RAD
Examination: SHOULDER 2+V LEFT

 

History: CHRONIC LEFT SHOULDER PAIN

 

Comparison/Correlation: None

 

Findings: Total of 3 images of the left shoulder were obtained. 

Acromioclavicular joint degenerative changes are present. Left 

glenohumeral relationship is unremarkable. Mild spurring of the bony 

glenoid evident. Left upper lung field is unremarkable.

 

Postoperative cervical spine fusion noted. Left second rib deformity which

may represent old fracture noted. Correlate with history. No acute or 

suspicious process.

 

Impression:

Mild degenerative changes of the left acromioclavicular joint. Mild 

spurring about the bony glenoid.

 

Electronically signed by: Loki Jimenez MD (9/26/2018 8:47 AM) [facility]

## 2019-01-18 ENCOUNTER — HOSPITAL ENCOUNTER (OUTPATIENT)
Dept: HOSPITAL 61 - KCIC MRI | Age: 64
Discharge: HOME | End: 2019-01-18
Attending: PHYSICIAN ASSISTANT
Payer: COMMERCIAL

## 2019-01-18 DIAGNOSIS — Z98.890: ICD-10-CM

## 2019-01-18 DIAGNOSIS — M47.898: ICD-10-CM

## 2019-01-18 DIAGNOSIS — M48.07: ICD-10-CM

## 2019-01-18 DIAGNOSIS — M48.061: ICD-10-CM

## 2019-01-18 DIAGNOSIS — M47.897: ICD-10-CM

## 2019-01-18 DIAGNOSIS — M51.36: Primary | ICD-10-CM

## 2019-01-18 PROCEDURE — A9585 GADOBUTROL INJECTION: HCPCS

## 2019-01-18 PROCEDURE — 72158 MRI LUMBAR SPINE W/O & W/DYE: CPT

## 2019-01-18 PROCEDURE — 72197 MRI PELVIS W/O & W/DYE: CPT

## 2019-01-18 NOTE — [facility]
MRI of the lumbar spine without and with contrast 1/18/2019

 

CLINICAL HISTORY: Low back pain with a history of previous lumbar spine 

surgery.

 

TECHNIQUE: Unenhanced T1-weighted and T2-weighted sagittal and axial and 

inversion recovery sagittal images of the lumbar spine were obtained. 

After the intravenous administration of 12 cc of Gadavist, enhanced 

T1-weighted sagittal and axial images of the lumbar spine were obtained.

 

FINDINGS: Mild S-shaped curvature of the thoracolumbar spine is seen. 

Degenerative signal changes and loss of height are seen involving all of 

the disks of the lumbar spine. Degenerative signal changes are seen within

the marrow surrounding these discs. The conus medullaris is normal 

morphology, position, and signal characteristics. Rounded high signal 

intensity lesions are seen involving both kidneys on the T2-weighted 

images, left greater than right. These measure 3 mm to 7 cm in greatest 

diameter. They likely represent cysts.

 

The patient is post posterolateral fusion using pedicle screws and 

stabilizing rods at L4-5.

 

On the axial images at the L1-2 disc space there is a mild generalized 

disc bulge. This is eccentric to the left. Degenerative changes are seen 

involving the facet joints bilaterally. These findings when combined do 

not result in significant central spinal canal or neural foraminal 

stenosis.

 

At the L2-3 disc space there is a moderate generalized disc bulge. The 

patient is post right lateral hemilaminotomy. Degenerative changes are 

seen involving the facet joints bilaterally. These findings when combine 

result in mild left-sided central spinal canal stenosis. Mild right 

greater than left neural foraminal stenosis is seen.

 

At the L3-4 disc space, the patient is post right hemilaminotomy. There is

a mild to moderate generalized disc bulge. This is eccentric to the left. 

Degenerative changes are seen involving the facet joints bilaterally. 

These findings result in mild to moderate left-sided central spinal canal 

stenosis. Mild left greater than right neural foraminal stenosis is seen.

 

At the L4-5 disc space there is a mild generalized disc bulge. This is 

eccentric to the right. Degenerative changes are seen involving the facet 

joints bilaterally. The patient is post right hemilaminotomy. These 

findings when combined do not result in significant central spinal canal 

stenosis. Mild to moderate right greater than left neural foraminal 

stenosis is seen.

 

At the L5-S1 disc space there is a moderate generalized disc bulge. This 

is eccentric to the left. Degenerative changes are seen involving the 

facet joints bilaterally. These findings do not result in significant 

central spinal canal stenosis. Mild to moderate left greater than right 

neural foraminal stenosis is seen.

 

IMPRESSION:

1. Postsurgical changes are seen as outlined above.

2. The changes of degenerative disc disease are seen throughout the lumbar

spine. These findings result in mild left-sided central spinal canal 

stenosis at L2-3 and mild to moderate left-sided central spinal canal 

stenosis at L3-4. Multilevel neural foraminal stenosis of varying severity

is seen as outlined above.

 

Electronically signed by: Abhi Ramos MD (1/18/2019 3:53 PM) [facility]

## 2019-01-18 NOTE — [facility]
MRI of the sacrum without and with contrast 1/18/2018

 

CLINICAL HISTORY: Bilateral hip pain with right SI joint pain.

 

TECHNIQUE: Unenhanced T1-weighted and fat-saturated T2-weighted sagittal, 

coronal oblique and axial images of the sacrum were obtained. After the 

intravenous administration of 12 cc Gadavist, enhanced T1-weighted 

sagittal and axial images were obtained.

 

FINDINGS: The marrow signal of the sacrum is within normal limits. No 

occult fracture is seen. The marrow signal of the coccyx is within normal 

limits. Degenerative signal changes are seen within the marrow surrounding

both SI joints. Mild degenerative changes are seen involving both SI 

joints. No ankylosis is seen. No area of abnormal contrast enhancement is 

seen.

 

IMPRESSION: Mild degenerative changes are seen involving both SI joints as

outlined above. Otherwise negative study.

 

Electronically signed by: Abhi Ramos MD (1/18/2019 5:25 PM) [facility]

## 2019-02-18 ENCOUNTER — HOSPITAL ENCOUNTER (OUTPATIENT)
Dept: HOSPITAL 61 - RAD | Age: 64
Discharge: HOME | End: 2019-02-18
Attending: NEUROLOGICAL SURGERY
Payer: COMMERCIAL

## 2019-02-18 VITALS
SYSTOLIC BLOOD PRESSURE: 167 MMHG | SYSTOLIC BLOOD PRESSURE: 167 MMHG | SYSTOLIC BLOOD PRESSURE: 167 MMHG | DIASTOLIC BLOOD PRESSURE: 71 MMHG | SYSTOLIC BLOOD PRESSURE: 167 MMHG | DIASTOLIC BLOOD PRESSURE: 71 MMHG | DIASTOLIC BLOOD PRESSURE: 71 MMHG | DIASTOLIC BLOOD PRESSURE: 71 MMHG | SYSTOLIC BLOOD PRESSURE: 167 MMHG | DIASTOLIC BLOOD PRESSURE: 71 MMHG

## 2019-02-18 VITALS — SYSTOLIC BLOOD PRESSURE: 158 MMHG | DIASTOLIC BLOOD PRESSURE: 89 MMHG

## 2019-02-18 DIAGNOSIS — M48.061: ICD-10-CM

## 2019-02-18 DIAGNOSIS — M43.16: ICD-10-CM

## 2019-02-18 DIAGNOSIS — M47.26: Primary | ICD-10-CM

## 2019-02-18 DIAGNOSIS — M48.07: ICD-10-CM

## 2019-02-18 DIAGNOSIS — M12.88: ICD-10-CM

## 2019-02-18 PROCEDURE — 72132 CT LUMBAR SPINE W/DYE: CPT

## 2019-02-18 PROCEDURE — 72265 MYELOGRAPHY L-S SPINE: CPT

## 2019-02-18 NOTE — NUR
Patient denies any complaint or issues after procedure. Discharge information on Myelogram 
discussed, patient and family verbalized understanding. Discharge information handed to 
patient along with CD of films from procedure today. Patient ambulated out of facility with 
family member without difficulty.

## 2019-02-18 NOTE — RAD
Lumbar myelogram, 2/18/2019:



History: Back and right leg pain, lumbar radiculopathy



Under local anesthesia, aseptic conditions and fluoroscopic guidance a lumbar

puncture was performed at the mid L2 level utilizing a 25-gauge Jackie

spinal needle.  Good clear CSF flow was obtained following which 14 cc of

Omnipaque 180 was injected into the thecal sac.  The spinal needle was then

removed and hemostasis obtained.  4.0 minutes of fluoroscopy time was

utilized.  15 fluoroscopic spot images were recorded.  The patient tolerated

the procedure well and was sent to CT in good condition.  The following

findings were delineated on the myelogram:



1.  There is a mild left convexity lumbar scoliosis with extensive

hypertrophic spurring at multiple levels.  Bilateral pedicle screws are

attached to longitudinally oriented posterior fixation rods at L4 and L5.  A

partially radiopaque disc spacers present at L4-5.

2.  There is minimal anterolisthesis at L4-5 which does not appear to change

significantly on upright lateral flexion and extension imaging.  No other

significant subluxation is evident.

3.  There is a prominent anterior extradural defect at L2-3 due to posterior

spurring which in conjunction with a mild posterior extradural defect is

causing moderate central spinal stenosis at that level in the upright

position.  The thecal sac measures 6 to 7 mm in AP diameter on these lateral

images.

4.  There is a moderate anterior extradural defect at L3-4 with mild

associated central spinal stenosis.

5.  There is a mild anterior extra defect at L1-2 without significant central

spinal stenosis.

6.  There is decreased opacification of the L3, L4 and L5 nerve root sleeves

bilaterally.







CT of the lumbar spine-post myelogram, 2/18/2018:



Multidetector CT imaging was performed with multiplanar reconstructions

produced.  The following findings are delineated:



1.  There is extensive anterior spurring in the upper lumbar spine.  At T12-L1

there is mild posterior marginal spurring.  The central spinal canal and

neural foramina are well maintained.



2.  At L1-2 there is mild posterior disc bulging and spurring.  There are mild

degenerative changes involving the facet joints.  The central spinal canal and

neural foramina are well maintained.



3.  At L2-3 there is severe disc space narrowing with a vacuum disc phenomena

and severe anterior and posterior spurring.  There is a partial laminectomy

defect on the right with absence of a portion of the right facet joint.  The

combination of findings is narrowing the thecal sac which measures 7 mm in AP

diameter at the midline.  There is moderate inferior bony foraminal narrowing

on the right at this level.



4.  At L3-4 there is also moderate disc space narrowing with a vacuum disc

phenomena and marginal spurring.    There is a laminectomy defect on the right

with absence of a portion of the right facet.  There is moderate facet joint

arthropathy on the left with posterior ligamentous thickening.  This causes

distortion and narrowing of the left side of the thecal sac at this level. 

There is moderate bilateral foraminal encroachment.



5.  At L4-5 the bilateral pedicle screws and rods appear intact.  There is

solid posterior fusion on the left.  There appears to be a laminectomy defect

on the right.  A partially radiopaque disc spacer is in place.  There is mild

anterolisthesis at L4-5.  The thecal sac in the central canal is not

significantly stenotic.  There is moderate right bony foraminal narrowing.



6.  At L5-S1 there is severe disc space narrowing with a vacuum disc phenomena

and prominent spurring both anteriorly and posteriorly.  The central spinal

canal is well maintained.  There is mild bilateral foraminal narrowing.



7.  Moderate aortoiliac calcific plaquing is present.





IMPRESSION:

1.  Multilevel postsurgical and moderate hypertrophic degenerative changes as

described above.

2.  Moderate central spinal stenosis at L2-3.

3.  Mild central spinal stenosis, predominantly on the left, at L3-4.

4.  Mild anterolisthesis at L4-5.





[employer] Compliance Statement:



One or more of the following individualized dose reduction techniques were

utilized for this examination:

1. Automated exposure control

2. Adjustment of the mA and/or kV according to patient size

3. Use of iterative reconstruction technique

## 2019-03-06 ENCOUNTER — HOSPITAL ENCOUNTER (OUTPATIENT)
Dept: HOSPITAL 61 - PNCL | Age: 64
Discharge: HOME | End: 2019-03-06
Attending: ANESTHESIOLOGY
Payer: COMMERCIAL

## 2019-03-06 DIAGNOSIS — I10: ICD-10-CM

## 2019-03-06 DIAGNOSIS — M19.90: ICD-10-CM

## 2019-03-06 DIAGNOSIS — M54.16: Primary | ICD-10-CM

## 2019-03-06 DIAGNOSIS — E11.9: ICD-10-CM

## 2019-03-06 DIAGNOSIS — M79.604: ICD-10-CM

## 2019-03-06 PROCEDURE — G0463 HOSPITAL OUTPT CLINIC VISIT: HCPCS

## 2019-03-07 NOTE — PAIN
DATE OF SERVICE:  03/06/2019



INITIAL CONSULTATION FOR PAIN CLINIC



CHIEF COMPLAINT:  Low back and right lower extremity pain.



HISTORY OF PRESENT ILLNESS:  This is a 63-year-old male who presents with

history of pain for many years, worse since August of this last year, pain in

the low back and right lower extremity, posterior gluteus,  posterior thigh,

lateral thigh and anterior thigh, status post multiple low back surgeries with

fusion.  The patient has recently seen his neurosurgeon.  He was not

recommending any further surgery at this time or interventions.  The patient

with significant pain in the low back, right leg.  As previously, the patient

reports it is constant, sharp, shooting, radiating, worse with walking,

standing, change in positions, waking him from sleep at night at least 3-4

times.  It does not affect his bowel or bladder control, but does affect his

ability to walk.  He is not using any assistive devices to ambulate.  The

patient did have a myelogram and an MRI scan of the lumbar spine dated

01/18/2019, which showed postsurgical changes, degenerative disk disease

throughout the lumbar spine, mild left-sided central spinal canal stenosis at

L2-L3, mild to moderate left-sided central spinal stenosis at L3-L4 and L4-L5

disk space with bulge eccentric to the right, post right hemilaminotomy as well

as post right hemilaminotomy at L3-L4.  The patient reports it is worse with

walking, standing, change in positions, getting up from seated position,

especially and standing for more than even 10-15 minutes.  The patient reports

he has had epidural injections in the past, chiropractic treatment, physical

therapy as recently as this year with chiropractic treatment none of which

helped significantly for long term.  The patient is taking gabapentin, Tylenol

PM as well as hydrocodone.  The hydrocodone does decrease the pain by about

30-40%, the others do not.  The patient reports no loss of motor function, has

significant fatigability of the right leg, even with walking more than about

10-15 minutes.



PAST MEDICAL HISTORY:  Significant for hypertension, type 2 diabetes, hearing

loss, arthritis, kidney stones.



PAST SURGICAL HISTORY:  Includes cataract extractions, weight loss surgery with

gastrectomy, bilateral total knee replacements, lumbar laminectomy with fusion,

cervical laminectomy and fusion, knee surgery bilaterally and a clavicle surgery

in the past.



CURRENT MEDICATIONS:  Include metoprolol, atorvastatin, Proscar, ropinirole,

hydrocodone, Tylenol, lisinopril and gabapentin.



ALLERGIES:  THE PATIENT IS ALLERGIC TO MORPHINE, WHICH CAUSES ITCHING.



FAMILY HISTORY:  Significant for lung cancer, colon cancer, high blood pressure

and arthritis.



SOCIAL HISTORY:  The patient does smoke less than a pack a day for the last 40

years.  Drinks alcohol about once a week.  He is not using any illegal, illicit

or recreational drugs.  The patient lives locally in [place], [place].  Reports

he is currently retired.



REVIEW OF SYSTEMS:  The patient's review of systems is positive for those items

mentioned in history of present illness.  All systems reviewed and otherwise

negative.  It is complete, full and well documented on the patient's chart.



PHYSICAL EXAMINATION:

VITAL SIGNS:  The patient's blood pressure is 173/106, pulse is 81, respirations

18, temperature is 97.9 degrees Fahrenheit.  Height is 5 feet 7 inches and

weight is 267 pounds. 

GENERAL:  The patient is awake, alert, oriented, appropriate, very pleasant

demeanor.

HEENT:  Head shows normocephalic, atraumatic.  Extraocular movements are intact

and symmetrical.  Oral cavity, mucous membranes moist and pink.  Dentition is

intact.

NECK:  Shows anterior throat supple without palpable lymphadenopathy noted. 

Swallow reflex symmetrical.

CHEST:  Shows normal on inspection.  Breath sounds are clear to auscultation

bilaterally.

HEART:  S1, S2 clear.  No murmurs auscultated.

ABDOMEN:  Soft, nontender, nondistended.  No palpable organomegaly is noted.  No

rebound or guarding.

BACK:  Shows spine grossly in the midline.  Normal appearing thoracic kyphosis,

very significant flattening of the lumbar lordotic curvature with well-healed

surgical scarring noted.  Lumbar paraspinous muscle shows symmetrical on

inspection, on palpation shows some moderate tenderness throughout the upper,

middle and lower distribution of paraspinous muscles with palpation, but only

diffusely without significant radiation, without atrophy or hypertrophy, without

trigger points.  No tenderness over the spinous processes, sacrum or sacroiliac

regions.  The patient has good rotational motion of the lumbar spine both

laterally greater than 10 degrees, right and left as well as extension, greater

than 10 degrees forward flexion, 45 degrees without significant pain reported.

EXTREMITIES:  Lower extremities show deep tendon reflexes at 1+ in the patellar

and tendo calcaneus tendons are equal.  Motor exam is 5/5 with dorsiflexion,

extension, quadriceps and hamstring flexion symmetrical and intact.  Peripheral

pulses are 1+ posterior tibial.  No peripheral edema is noted.  Lower

extremities are warm and dry to the touch, equal in color and appearance.  The

patient's straight leg raise is noted to be negative bilaterally.  Gaenslen's

and Rasheed's maneuvers are negative bilaterally as well.  The patient is able

to stand, has difficulty trying to stand on his toes as he does lose balance,

but is able to do this.  He is walking with a slight antalgic gait.  Does not

appear to favor the right lower extremity significantly, but does have a bit of

a wide stance gait and shuffles a bit.  He is not using any assistive devices to

ambulate.

SKIN:  Warm and dry.  Good turgor.  No edema.  No sores, rashes or bruising.



IMPRESSION:

1.  This is a 63-year-old male with long history of low back pain, right lower

extremity pain, now worse since past August.

2.  Recent consultation with neurosurgeon not recommending any further surgical

intervention.

3.  MRI scan and CT scan of the lumbar spine as noted.

4.  Arthritis.

5.  Hypertension.

6.  Type 2 diabetes.



PLAN:  Options were discussed with the patient including conservative medical

management, continued physical therapy, interventional techniques and he would

like to pursue interventional techniques.  We discussed spinal cord stimulator

as this was recommended through his neurosurgeon as well with no new surgical

findings or recommendations for intervention with surgical technique, the

patient would like to pursue this.  We discussed the stimulator in great detail

and gave the patient some information to look on his own by his request to do

some more research on this as well.  The patient will follow up once the

preauthorization is obtained for spinal cord stimulator temporary lead placement

in trial.  In the meantime, the patient will continue doing stretching and

strengthening exercises, take his medication as prescribed and we will follow up

once preauthorization is obtained for spinal cord stimulator trial temporary

lead replacement.

 



______________________________

JEN CASTRO MD



DR:  MADDIE/keara  JOB#:  [id number] / [id number]

DD:  03/06/2019 12:10  DT:  03/07/2019 02:12

## 2019-04-03 ENCOUNTER — HOSPITAL ENCOUNTER (OUTPATIENT)
Dept: HOSPITAL 61 - PNCL | Age: 64
Discharge: HOME | End: 2019-04-03
Attending: ANESTHESIOLOGY
Payer: COMMERCIAL

## 2019-04-03 DIAGNOSIS — M48.061: ICD-10-CM

## 2019-04-03 DIAGNOSIS — M96.1: ICD-10-CM

## 2019-04-03 DIAGNOSIS — M51.16: Primary | ICD-10-CM

## 2019-04-03 DIAGNOSIS — Z88.5: ICD-10-CM

## 2019-04-03 PROCEDURE — 63650 IMPLANT NEUROELECTRODES: CPT

## 2019-04-04 NOTE — PAIN
DATE OF SERVICE:  04/03/2019



DIAGNOSES:  Lumbar radiculopathy with lumbar degenerative disk disease, lumbar

spinal stenosis and post-lumbar laminectomy syndrome.



HISTORY OF PRESENT ILLNESS:  The patient is a 63-year-old male who returns for

followup status post initial evaluation and preauthorization for spinal cord

stimulator temporary lead placement, the patient obtained this, also psychiatric

evaluation which he was placed in a good category for implantable devices and

spinal surgery.  The patient would like to proceed.  The patient reports still

significant pain in low back, mostly in the right lower extremity, right

posterior gluteus, posterior thigh, posterior calf and lateral thigh as well and

some in the groin on the right side.  The patient reports it is 9 on a scale of

10 at its worst, 7 on average, 4 at its least and is 7 today.  The patient

reports a burning, aching, sharp, radiating, becoming more constant with

walking, standing, change in positions.  Again, the patient admitted to multiple

other modalities, physical therapies, interventional techniques with epidural

steroid injections, had back surgery as well multiple times with instrumentation

and fusion with still significant pain in the low back and right lower

extremity.  The patient reports no new motor or sensory deficits, no new bowel

or bladder incontinence.  Reports it was awakened from sleep at least 4 times at

night and his chief complaint is inability to sleep comfortably.



PHYSICAL EXAMINATION:

VITAL SIGNS:  Today, his blood pressure is 139/71, pulse is 87, respirations are

18, temperature is 98.0 degrees Fahrenheit.  Height 5 feet 8 inches, weight is

262 pounds.

GENERAL:  The patient is awake, alert, oriented, appropriate, very pleasant

demeanor.

HEENT:  Normocephalic, atraumatic.  Extraocular movements are intact and

symmetrical.  Oral cavity:  Mucous membranes moist and pink.  Dentition is

intact.

NECK:  Shows anterior throat supple without palpable lymphadenopathy noted. 

Swallow reflex symmetrical.

CHEST:  Shows normal on inspection.  Breath sounds clear to auscultation

bilaterally.

HEART:  Shows S1, S2 clear.  No murmurs auscultated.

ABDOMEN:  Soft, nontender, nondistended.  No palpable organomegaly is noted.  No

rebound or guarding demonstrated.

BACK:  Spine grossly in the midline.  Normal appearing thoracic kyphosis and

lumbar lordotic curvature is slightly flattened with well-healed surgical scar

noted.  Lumbar paraspinous muscle shows symmetrical on inspection; with

palpation shows some moderate tenderness throughout the upper, middle, lower

distribution of paraspinous muscles bilaterally, but only with deeper palpation.

 The patient shows no difficulty with range of motion or increased pain with

extension or flexion, but some limited rotation secondary to mechanical

inability.

EXTREMITIES:  The patient's lower extremities show deep tendon reflexes at 1+ in

the patellar and tendo calcaneus tendons.  Motor exam is strong with 5/5

dorsiflexion, extension, equal and symmetrical.  Peripheral pulses are 1+

posterior tibia.  No peripheral edema is noted bilaterally.



Options were discussed with the patient.  The patient's old chart was reviewed

as was his current medication regimen updated.  Current review of systems

updated today as well and we will proceed with a spinal cord stimulator

temporary lead placement x 2.  Risks were discussed including but not limited to

bleeding, infection, possibility of epidural hematoma, subsequent neurological

compromise, dural puncture, headaches, spinal cord and/or nerve damage as well

as exposure to fluoroscopy and poor results regarding pain control.  The patient

understands and wished to proceed.  He is to return to clinic in approximately 1

week for followup and removal of temporary leads and assessment of the pain

level at that time.



DIAGNOSES:  Lumbar radiculopathy with lumbar degenerative disk disease, lumbar

spinal stenosis and post-lumbar laminectomy syndrome.



PROCEDURE:  Spinal cord stimulator temporary lead placement x 2 using C-arm

fluoroscopic guidance under sterile prep and drape with a C-arm fluoroscopic

guidance with AP and lateral views.  Local anesthetic was used to anesthetize

the skin and in entrance of the spinal cord stimulator needles 14-gauge Hustead

with stylets with preservative-free normal saline and loss of resistance

technique was carried out both right and left of midline, first left, than right

with good preservative-free normal saline and loss of resistance technique. 

Negative aspiration was noted at each injection site.  Spinal cord stimulator

leads were then threaded into the epidural space under direct visual C-arm

fluoroscopy both AP and lateral views and without difficulty, without

resistance, without paresthesia to rest in the midline at first left paramedian

lead with the tip at the superior endplate of T8 and the second right

paramedian, the tip of the lead at the superior endplate of T9.



These were confirmed posterior in the epidural space with lateral visualization

of the fluoroscopy.  Needle was withdrawn.  Stylets were withdrawn.  The leads

were secured with suture and Tegaderm as well as reinforcement with sterile

tape.  Condition at discharge was stable.  The patient tolerated the procedure

well, had no complications, left on his own power with no immediate

complications noted.

 



______________________________

JEN CASTRO MD



DR:  MADDIE/keara  JOB#:  [id number] / [id number]

DD:  04/03/2019 14:15  DT:  04/04/2019 07:09

## 2019-04-10 ENCOUNTER — HOSPITAL ENCOUNTER (OUTPATIENT)
Dept: HOSPITAL 61 - PNCL | Age: 64
Discharge: HOME | End: 2019-04-10
Attending: ANESTHESIOLOGY
Payer: COMMERCIAL

## 2019-04-10 DIAGNOSIS — M96.1: ICD-10-CM

## 2019-04-10 DIAGNOSIS — M48.061: Primary | ICD-10-CM

## 2019-04-10 DIAGNOSIS — M51.16: ICD-10-CM

## 2019-04-10 PROCEDURE — G0463 HOSPITAL OUTPT CLINIC VISIT: HCPCS

## 2019-04-11 NOTE — PAIN
DATE OF SERVICE:  04/10/2019



DIAGNOSES:  Lumbar radiculopathy with lumbar spinal stenosis, lumbar

degenerative disk disease and post-lumbar laminectomy syndrome.



HISTORY OF PRESENT ILLNESS:  The patient is a 63-year-old male who returns for

followup status post spinal cord stimulator temporary leads placed one week ago.

 The patient returns today reporting very good relief with about 80-90%

improvement in his pain in his low back and right lower extremity.  Over the

past week, the patient is very pleased with his activity, has been increasing

his distance walking, doing working activities, household activities, working in

his workshop, sleeping better.  The patient reports he has better attitude and

is taking less pain medication as well as, significantly reduced by about 50% of

the amount.  The patient reports the pain is still in the low back, but is only

about 10-20% of what it normally is.  The patient reports it is burning pain,

mainly in the back and right leg.  The patient reports it is 2 on a scale of 10

at its worst, 2 on average and 0 at its least.  The patient reports it is 2

today.  The patient reports no new motor or sensory deficits, no new bowel or

bladder incontinence or other complaints.



PHYSICAL EXAMINATION:

VITAL SIGNS:  The patient's blood pressure is 139/80, pulse 80, respirations 16,

temperature 97.9 degrees Fahrenheit.

GENERAL:  The patient is awake, alert, oriented, appropriate, very pleasant

demeanor.

HEENT:  Head shows normocephalic, atraumatic.  Extraocular movements are intact

and symmetrical.  Oral cavity:  Mucous membranes are moist and pink.  Dentition

is intact.

NECK:  Shows anterior throat supple.

CHEST:  Shows breath sounds clear to auscultation bilaterally.

HEART:  Shows S1, S2 clear.

ABDOMEN:  Soft, nontender, nondistended.

BACK:  Shows spine grossly in the midline.  Lumbar paraspinous muscle shows

symmetrical on inspection.  The patient's bandages were taken down and

reinforcement tape over the spinal cord stimulator leads revealing leads, which

were clean and dry, no erythema, no tenderness, no discharge.  The patient's

leads were removed under sterile prep and drape in a sterile technique using a

suture removal kit.  Sutures were cut and the leads were removed x 2 with tips

intact.  Again the site clean and dry, no erythema, no tenderness or discharge. 

A sterile bandage was applied.



Options were discussed with the patient.  The patient's old chart was reviewed

as was his current medication regimen updated.  Current review of systems

updated today as well.  We will have the patient schedule for a permanent

implant of the spinal cord stimulator system.  The patient will continue with

strengthening and stretching exercises as well as exercise as tolerated in the

meantime and we will make the arrangements for permanent stimulator system to be

placed.

 



______________________________

JEN CASTRO MD



DR:  MADDIE/keara  JOB#:  [id number] / [id number]

DD:  04/10/2019 13:31  DT:  04/11/2019 03:50

## 2019-05-07 ENCOUNTER — HOSPITAL ENCOUNTER (OUTPATIENT)
Dept: HOSPITAL 61 - PNCL | Age: 64
Discharge: HOME | End: 2019-05-07
Attending: ANESTHESIOLOGY
Payer: COMMERCIAL

## 2019-05-07 DIAGNOSIS — M51.16: Primary | ICD-10-CM

## 2019-05-07 DIAGNOSIS — M96.1: ICD-10-CM

## 2019-05-07 DIAGNOSIS — M48.061: ICD-10-CM

## 2019-05-07 PROCEDURE — G0463 HOSPITAL OUTPT CLINIC VISIT: HCPCS

## 2019-05-07 NOTE — PAIN
DATE OF SERVICE:  05/07/2019



DIAGNOSES:  Lumbar radiculopathy with lumbar spinal stenosis, lumbar

degenerative disk disease and post-lumbar laminectomy syndrome.



HISTORY OF PRESENT ILLNESS:  The patient is a 63-year-old male who returns for

followup status post spinal cord stimulator trial with very good results.  We

had scheduled him for permanent implant.  However, he was having difficulty

finding a facility that was covered well with his insurance provider.  We had

discussed this with he, the referring surgeon as well as the spinal cord

stimulator representatives on trying to find a facility that would be less

expensive for him out of pocket; however, he is not able to do that.  The

patient returns today wishing to proceed with the implant.  He did very well

with about 90% plus improvement with his stimulator system on the temporary

leads and we will make arrangements to do that here at [facility]

in the near future.  The patient reports still doing well, still pain in the low

back, right lower extremity as it was prior to the stimulator trial, but again

about 90% improvement with the trial and the stimulator temporary leads.  The

patient reports the pain is aching, shooting, burning, constant in the low back,

right leg, posterior gluteus, posterior thigh, anterior thigh as well as the

groin on the right.  The patient reports it is an 8 on a scale of 10 at its

worst, 6 on average, 4 at its least and is 4 today.  With his these

hemodynamics, he was increasing his activity with greater distance walking,

sleeping better, driving car better, traveling better.  The patient reports no

new motor or sensory deficits, no new bowel or bladder incontinence or other

complaints.



PHYSICAL EXAMINATION:

VITAL SIGNS:  Today, the patient's blood pressure is 158/86, pulse 74,

respirations 18, temperature 97.6 degrees Fahrenheit.  Height 5 feet 7-1/2

inches and weight is 265 pounds.

GENERAL:  The patient is awake, alert, oriented, appropriate, very pleasant

demeanor.

HEENT:  Head is normocephalic, atraumatic.  Extraocular movements are intact,

symmetrical.  Oral cavity:  Mucous membranes moist and pink.  Dentition is

intact.

NECK:  Shows anterior throat supple without palpable lymphadenopathy noted. 

Swallow reflex is symmetrical.

CHEST:  Shows normal on inspection.  Breath sounds are clear to auscultation

bilaterally.

HEART:  Shows S1, S2 clear.  No murmurs auscultated.

ABDOMEN:  Soft, nontender, nondistended.  No palpable organomegaly is noted.  No

rebound or guarding demonstrated.

BACK:  Shows spine grossly in the midline.  Normal-appearing thoracic kyphosis

and lumbar lordotic curvature is slightly flattened with well-healed surgical

scar noted in the lumbar distribution.

EXTREMITIES:  The patient's lower extremities show deep tendon reflexes at 1+ in

the patellar and tendo-calcaneus tendons.  Motor exam is strong with 5/5

dorsiflexion, extension, quadriceps and hamstring flexion and symmetrical

bilaterally.  Peripheral pulses are 1+ posterior tibia.  No peripheral edema is

noted.



Options were discussed with the patient.  The patient's old chart was reviewed

as was his current medication regimen updated.  Current review of systems

updated today as well and we will proceed with scheduling for permanent implant

with several companies and we will make arrangements as the operating room

schedule allows.

 



______________________________

JEN CASTRO MD



DR:  MADDIE/keara  JOB#:  [id number] / [id number]

DD:  05/07/2019 09:12  DT:  05/07/2019 20:14

## 2019-05-31 ENCOUNTER — HOSPITAL ENCOUNTER (OUTPATIENT)
Dept: HOSPITAL 61 - SURG | Age: 64
Discharge: HOME | End: 2019-05-31
Attending: ANESTHESIOLOGY
Payer: COMMERCIAL

## 2019-05-31 VITALS
SYSTOLIC BLOOD PRESSURE: 147 MMHG | DIASTOLIC BLOOD PRESSURE: 72 MMHG | SYSTOLIC BLOOD PRESSURE: 147 MMHG | SYSTOLIC BLOOD PRESSURE: 147 MMHG | DIASTOLIC BLOOD PRESSURE: 72 MMHG | SYSTOLIC BLOOD PRESSURE: 147 MMHG | DIASTOLIC BLOOD PRESSURE: 72 MMHG | SYSTOLIC BLOOD PRESSURE: 147 MMHG | SYSTOLIC BLOOD PRESSURE: 147 MMHG | DIASTOLIC BLOOD PRESSURE: 72 MMHG | DIASTOLIC BLOOD PRESSURE: 72 MMHG | DIASTOLIC BLOOD PRESSURE: 72 MMHG

## 2019-05-31 VITALS — WEIGHT: 265 LBS | BODY MASS INDEX: 41.11 KG/M2 | HEIGHT: 67.5 IN

## 2019-05-31 DIAGNOSIS — M51.16: ICD-10-CM

## 2019-05-31 DIAGNOSIS — M96.1: Primary | ICD-10-CM

## 2019-05-31 DIAGNOSIS — M48.061: ICD-10-CM

## 2019-05-31 LAB
ANION GAP SERPL CALC-SCNC: 8 MMOL/L (ref 6–14)
BASOPHILS # BLD AUTO: 0 X10^3/UL (ref 0–0.2)
BASOPHILS NFR BLD: 1 % (ref 0–3)
BUN SERPL-MCNC: 10 MG/DL (ref 8–26)
CALCIUM SERPL-MCNC: 9.2 MG/DL (ref 8.5–10.1)
CHLORIDE SERPL-SCNC: 104 MMOL/L (ref 98–107)
CO2 SERPL-SCNC: 30 MMOL/L (ref 21–32)
CREAT SERPL-MCNC: 0.9 MG/DL (ref 0.7–1.3)
EOSINOPHIL NFR BLD: 0.2 X10^3/UL (ref 0–0.7)
EOSINOPHIL NFR BLD: 2 % (ref 0–3)
ERYTHROCYTE [DISTWIDTH] IN BLOOD BY AUTOMATED COUNT: 13 % (ref 11.5–14.5)
GFR SERPLBLD BASED ON 1.73 SQ M-ARVRAT: 85 ML/MIN
GLUCOSE SERPL-MCNC: 161 MG/DL (ref 70–99)
HCT VFR BLD CALC: 38.3 % (ref 39–53)
HGB BLD-MCNC: 12.9 G/DL (ref 13–17.5)
LYMPHOCYTES # BLD: 1.7 X10^3/UL (ref 1–4.8)
LYMPHOCYTES NFR BLD AUTO: 27 % (ref 24–48)
MCH RBC QN AUTO: 31 PG (ref 25–35)
MCHC RBC AUTO-ENTMCNC: 34 G/DL (ref 31–37)
MCV RBC AUTO: 91 FL (ref 79–100)
MONO #: 0.6 X10^3/UL (ref 0–1.1)
MONOCYTES NFR BLD: 10 % (ref 0–9)
NEUT #: 3.9 X10^3UL (ref 1.8–7.7)
NEUTROPHILS NFR BLD AUTO: 61 % (ref 31–73)
PLATELET # BLD AUTO: 165 X10^3/UL (ref 140–400)
POTASSIUM SERPL-SCNC: 4.1 MMOL/L (ref 3.5–5.1)
RBC # BLD AUTO: 4.22 X10^6/UL (ref 4.3–5.7)
SODIUM SERPL-SCNC: 142 MMOL/L (ref 136–145)
WBC # BLD AUTO: 6.5 X10^3/UL (ref 4–11)

## 2019-05-31 PROCEDURE — C1778 LEAD, NEUROSTIMULATOR: HCPCS

## 2019-05-31 PROCEDURE — A7015 AEROSOL MASK USED W NEBULIZE: HCPCS

## 2019-05-31 PROCEDURE — 76000 FLUOROSCOPY <1 HR PHYS/QHP: CPT

## 2019-05-31 PROCEDURE — 63685 INS/RPLC SPI NPG/RCVR POCKET: CPT

## 2019-05-31 PROCEDURE — 36415 COLL VENOUS BLD VENIPUNCTURE: CPT

## 2019-05-31 PROCEDURE — 85025 COMPLETE CBC W/AUTO DIFF WBC: CPT

## 2019-05-31 PROCEDURE — C1822 GEN, NEURO, HF, RECHG BAT: HCPCS

## 2019-05-31 PROCEDURE — 80048 BASIC METABOLIC PNL TOTAL CA: CPT

## 2019-05-31 NOTE — DISCH
DISCHARGE INSTRUCTIONS


Condition on Discharge


Condition on Discharge:  Stable





Activity After Discharge


Activity Instructions for Disc:  Activity as tolerated, Avoid exertion


Bathing Instructions:  Shower-keep dressing dry


Lifting Instructions after Dis:  No heavy lifting, No pulling or pushing, Do not

lift >10 pounds


Exercise Instruction after Dis:  Progress as tolerated


Driving Instructions after Dis:  Do not drive today, No driving for 2 weeks


Weight Bearing Status after Di:  No restrictions





Diet after Discharge


Diet after Discharge:  Regular


Additional Diet Restrictions:  resume home diet


Diet Texture:  Regular


Liquid Texture:  Thin Liquid


Swallowing Supervision:  None needed





Wound Incision Care


Wound/Incision Care:  Ice to area for comfort, Reinforce dressing PRN


Wound Care Equipment:  Dressings





Contacting the DRShefali after DC


Call your doctor for:  Concerns you may have





Treatment/Equipment after DC


Adaptive Equipment Issued:  None











JEN CASTRO MD               May 31, 2019 13:49

## 2019-05-31 NOTE — PDOC4
OPERATIVE NOTE


Date:


Date:  May 31, 2019





Pre-Op Diagnosis:


Post laminectomy syndrome





Post-Op Diagnosis:


same





Procedure Performed:


SCS permanent implant





Surgeon:


Bala





Anesthesia Type:


GET





Blood Loss:


50cc





Specimans Obtained:


none





Findings:


as dictated





Complications:


none





Operative Note:


as dictated











JEN CASTRO MD               May 31, 2019 13:52

## 2019-06-01 NOTE — PAIN
DATE OF SERVICE:  05/31/2019



PREOPERATIVE DIAGNOSES:  Lumbar radiculopathy with post-lumbar laminectomy

syndrome.



POSTOPERATIVE DIAGNOSIS:  Lumbar radiculopathy with post-lumbar laminectomy

syndrome.



PROCEDURE:  Spinal cord stimulator permanent placement with generator and dual

leads.



ANESTHESIA:  General endotracheal.



COMPLICATIONS:  None.



BLOOD LOSS:  Approximately 50 mL.



DESCRIPTION OF PROCEDURE:  The patient was consented for spinal cord stimulator

permanent placement with leads and generator, with risks discussed including,

but not limited to bleeding, infection, possibility of epidural hematoma and

subsequent neurological compromise, dural punctures, headaches, spinal cord

and/or nerve damage, exposure of fluoroscopy as well as poor results regarding

pain control.  The patient understands and wished to proceed.  He and his spouse

were both present during the discussion and consents to proceeding.  The patient

was taken to operating suite #6.  General endotracheal anesthesia was induced. 

The patient was then placed in the prone position with pressure points padded

and breath sounds clear to auscultation bilaterally.  The patient's back was

then prepped in sterile prep fashion, allowed this prep to dry for 3 minutes

prior to draping.  Once the patient was draped in the usual sterile fashion,

C-arm fluoroscopy was used again with a sterile drape on under C-arm itself. 

For localization and identification of the patient's vertebral levels, external

marker was placed over the T8 vertebral level for reference point.  At this

time, the lumbar spine was inspected with C-arm fluoroscopy and external marking

at the level of the L2-L3 interspace was identified using 1% lidocaine with

1:200,000 epinephrine.  Skin wheal was raised and then deeper tissue

localization was utilized to anesthetize the skin and subcutaneous tissues using

a 14-gauge Plastic Jungletead needle with stylet.  The anesthetized area was then entered

with preservative-free normal saline to identify the epidural space, identified

easily without aspiration.  At this time, a spinal cord stimulator lead #1 was

then threaded through the needle  after stylet was removed and under direct

visualization and fluoroscopic guidance, was guided into the epidural space both

in the midline and posteriorly, confirmed with lateral C-arm fluoroscopic views

to lie with the superior lead of the first wire at the superior endplate of T8,

again in midline and verified with C-arm fluoroscopic guidance laterally to be

in the posterior epidural space.  At this time, the lumbar spine was then again

identified with fluoroscopy and one level below this at L3-L4 to the right of

midline, again skin wheal was raised with 1% lidocaine with 1:200,000

epinephrine.  Second 14-gauge Plastic Jungletead needle with stylet was then introduced

using preservative-free normal saline loss of resistance technique once again. 

The epidural space was identified again under direct fluoroscopic view for

needle placement.  The second spinal cord stimulator wire was then placed

through the needle and threaded under direct visualization with fluoroscopy to

lie just adjacent to the right of the first wire with the tip of the second wire

electrode at the superior endplate of T9.  This was verified with both AP and

lateral views of fluoroscopy to be in the posterior epidural space and midline

as well.  At this time, additional 1% lidocaine with epinephrine was then used

to the anesthetize the skin superiorly and inferiorly of the needle insertion

sites approximately 5 cm superior and 2 cm inferior.  Using a 15 blade scalpel,

then the skin was incised above and below the needles to incorporate the needles

themselves in the wound.  This was continued out with sharp dissection using

electrocautery bipolar to control any local hemorrhaging.  This was carried down

with dissection down to the lower distal ends of the needles.  Using Weitlaner

retractor as well as [employer], this was dissected further to identify the

exposed area of the needle outside of the epidural space next to the lamina at

the vertebral levels adjacent to the placement of the needles.  At this time

with intermittent fluoroscopy of the superior ends of the wire placements at T8

and T9, the needles were then removed as well as the stylets of the spinal cord

stimulator leads verifying that no movement occurred while the needles and

stylets were removed and this was indeed verified.  Using locking anchoring

devices over the spinal cord stimulator wires, each was placed again with

intermittent fluoroscopic views to maintain placement of the superior ends of

the stimulator wires, which was verified x 2.  Using a 0-silk suture, the locked

device was once attached to the spinal cord stimulator wires and locked in

place.  We then sutured to the periosteum the right paramedian aspect of the

vertebral levels adjacent to the stimulator wires.  Again, fluoroscopic views

were utilized to make sure no movement had occurred and this is again verified. 

At this time, the wound was investigated and local hemorrhage was controlled

using bipolar cautery and irrigation with bacitracin irrigation x 3 with no

local hemorrhage identified.  Next, the area adjacent to the inferior aspect of

the incision to the right of midline in the posterior middle to lower lumbar

distribution was anesthetized in a vertical fashion of approximately 5 cm with

1% lidocaine and 1:200,000 epinephrine for pocket creation.  Using again a 15

blade scalpel, incision was made with both sharp and dull dissection to create a

pocket to the lateral aspect of the incision approximately 3 cm lateral and 5 cm

vertical superior to inferior.  Again, local hemorrhage was controlled using

electro bipolar cautery and irrigation.  Pocket was created with sizing template

to verify correct placement for the spinal cord stimulator generator.  Again,

the pocket was identified, irrigated with bacitracin irrigation x 3 with no

local hemorrhage remaining after electrocautery bipolar and irrigation.  At this

time, tunneling device was used to tunnel from the midline incision from the

stimulator wire placement to the pocket itself and the wires were then

transferred through the tunneling device into the pocket.  Stimulator generator

was then attached to the wires and locked in place once impedances were verified

with good impedance at all leads and good contact.  Wires were then coiled

posteriorly to the stimulator with the branded label facing posteriorly in the

pocket itself of the stimulator generator.  Using a 0-silk suture, this was

secured to the superior end of the pocket and around the stimulator generator

through the anchoring holes and the generator itself.  Again, the pocket was

inspected for local hemorrhage, none was discovered, irrigated again with the

stimulator in place and the generator with bacitracin irrigation x 3 in both the

pocket and the area containing the spinal cord stimulator wires and anchoring

devices.  At this time, the wounds were then closed using intermittent 2-0

Vicryl for the deeper fascia and Boogie's fascia and both wounds and then a

running 2-0 for the upper levels of the deeper fascia with both wounds and skin

was closed in a running fashion using a 3-0 Vicryl.  Sterile bandages were

applied, Steri-Strips, Mastisol and island dressings.  The patient tolerated the

procedure well, had no immediate complications, was transferred to the recovery

room after extubation in good stable and awake condition and will follow up in

approximately 1 week as scheduled or sooner as necessary.

 



______________________________

JEN CASTRO MD



DR:  MADDIE/keara  JOB#:  [id number] / [id number]

DD:  05/31/2019 14:20  DT:  06/01/2019 23:52

## 2019-06-03 NOTE — OP
DATE OF SURGERY:  05/31/2019



PREOPERATIVE DIAGNOSES:  Lumbar radiculopathy with post-lumbar laminectomy

syndrome.



POSTOPERATIVE DIAGNOSIS:  Lumbar radiculopathy with post-lumbar laminectomy

syndrome.



PROCEDURE:  Spinal cord stimulator permanent placement with generator and dual

leads.



ANESTHESIA:  General endotracheal.



COMPLICATIONS:  None.



BLOOD LOSS:  Approximately 50 mL.



DESCRIPTION OF PROCEDURE:  The patient was consented for spinal cord stimulator

permanent placement with leads and generator, with risks discussed including,

but not limited to bleeding, infection, possibility of epidural hematoma and

subsequent neurological compromise, dural punctures, headaches, spinal cord

and/or nerve damage, exposure of fluoroscopy as well as poor results regarding

pain control.  The patient understands and wished to proceed.  He and his spouse

were both present during the discussion and consents to proceeding.  The patient

was taken to operating suite #6.  General endotracheal anesthesia was induced. 

The patient was then placed in the prone position with pressure points padded

and breath sounds clear to auscultation bilaterally.  The patient's back was

then prepped in sterile prep fashion, allowed this prep to dry for 3 minutes

prior to draping.  Once the patient was draped in the usual sterile fashion,

C-arm fluoroscopy was used again with a sterile drape on under C-arm itself. 

For localization and identification of the patient's vertebral levels, external

marker was placed over the T8 vertebral level for reference point.  At this

time, the lumbar spine was inspected with C-arm fluoroscopy and external marking

at the level of the L2-L3 interspace was identified using 1% lidocaine with

1:200,000 epinephrine.  Skin wheal was raised and then deeper tissue

localization was utilized to anesthetize the skin and subcutaneous tissues using

a 14-gauge Algorithmiatead needle with stylet.  The anesthetized area was then entered

with preservative-free normal saline to identify the epidural space, identified

easily without aspiration.  At this time, a spinal cord stimulator lead #1 was

then threaded through the needle  after stylet was removed and under direct

visualization and fluoroscopic guidance, was guided into the epidural space both

in the midline and posteriorly, confirmed with lateral C-arm fluoroscopic views

to lie with the superior lead of the first wire at the superior endplate of T8,

again in midline and verified with C-arm fluoroscopic guidance laterally to be

in the posterior epidural space.  At this time, the lumbar spine was then again

identified with fluoroscopy and one level below this at L3-L4 to the right of

midline, again skin wheal was raised with 1% lidocaine with 1:200,000

epinephrine.  Second 14-gauge Algorithmiatead needle with stylet was then introduced

using preservative-free normal saline loss of resistance technique once again. 

The epidural space was identified again under direct fluoroscopic view for

needle placement.  The second spinal cord stimulator wire was then placed

through the needle and threaded under direct visualization with fluoroscopy to

lie just adjacent to the right of the first wire with the tip of the second wire

electrode at the superior endplate of T9.  This was verified with both AP and

lateral views of fluoroscopy to be in the posterior epidural space and midline

as well.  At this time, additional 1% lidocaine with epinephrine was then used

to the anesthetize the skin superiorly and inferiorly of the needle insertion

sites approximately 5 cm superior and 2 cm inferior.  Using a 15 blade scalpel,

then the skin was incised above and below the needles to incorporate the needles

themselves in the wound.  This was continued out with sharp dissection using

electrocautery bipolar to control any local hemorrhaging.  This was carried down

with dissection down to the lower distal ends of the needles.  Using Weitlaner

retractor as well as [employer], this was dissected further to identify the

exposed area of the needle outside of the epidural space next to the lamina at

the vertebral levels adjacent to the placement of the needles.  At this time

with intermittent fluoroscopy of the superior ends of the wire placements at T8

and T9, the needles were then removed as well as the stylets of the spinal cord

stimulator leads verifying that no movement occurred while the needles and

stylets were removed and this was indeed verified.  Using locking anchoring

devices over the spinal cord stimulator wires, each was placed again with

intermittent fluoroscopic views to maintain placement of the superior ends of

the stimulator wires, which was verified x 2.  Using a 0-silk suture, the locked

device was once attached to the spinal cord stimulator wires and locked in

place.  We then sutured to the periosteum the right paramedian aspect of the

vertebral levels adjacent to the stimulator wires.  Again, fluoroscopic views

were utilized to make sure no movement had occurred and this is again verified. 

At this time, the wound was investigated and local hemorrhage was controlled

using bipolar cautery and irrigation with bacitracin irrigation x 3 with no

local hemorrhage identified.  Next, the area adjacent to the inferior aspect of

the incision to the right of midline in the posterior middle to lower lumbar

distribution was anesthetized in a vertical fashion of approximately 5 cm with

1% lidocaine and 1:200,000 epinephrine for pocket creation.  Using again a 15

blade scalpel, incision was made with both sharp and dull dissection to create a

pocket to the lateral aspect of the incision approximately 3 cm lateral and 5 cm

vertical superior to inferior.  Again, local hemorrhage was controlled using

electro bipolar cautery and irrigation.  Pocket was created with sizing template

to verify correct placement for the spinal cord stimulator generator.  Again,

the pocket was identified, irrigated with bacitracin irrigation x 3 with no

local hemorrhage remaining after electrocautery bipolar and irrigation.  At this

time, tunneling device was used to tunnel from the midline incision from the

stimulator wire placement to the pocket itself and the wires were then

transferred through the tunneling device into the pocket.  Stimulator generator

was then attached to the wires and locked in place once impedances were verified

with good impedance at all leads and good contact.  Wires were then coiled

posteriorly to the stimulator with the branded label facing posteriorly in the

pocket itself of the stimulator generator.  Using a 0-silk suture, this was

secured to the superior end of the pocket and around the stimulator generator

through the anchoring holes and the generator itself.  Again, the pocket was

inspected for local hemorrhage, none was discovered, irrigated again with the

stimulator in place and the generator with bacitracin irrigation x 3 in both the

pocket and the area containing the spinal cord stimulator wires and anchoring

devices.  At this time, the wounds were then closed using intermittent 2-0

Vicryl for the deeper fascia and Boogie's fascia and both wounds and then a

running 2-0 for the upper levels of the deeper fascia with both wounds and skin

was closed in a running fashion using a 3-0 Vicryl.  Sterile bandages were

applied, Steri-Strips, Mastisol and island dressings.  The patient tolerated the

procedure well, had no immediate complications, was transferred to the recovery

room after extubation in good stable and awake condition and will follow up in

approximately 1 week as scheduled or sooner as necessary.

 



______________________________

JEN CASTRO MD



DR:  MADDIE/keara  JOB#:  [id number] / [id number]

DD:  05/31/2019 14:20  DT:  06/01/2019 23:52

## 2019-06-07 ENCOUNTER — HOSPITAL ENCOUNTER (OUTPATIENT)
Dept: HOSPITAL 61 - PNCL | Age: 64
Discharge: HOME | End: 2019-06-07
Attending: ANESTHESIOLOGY
Payer: COMMERCIAL

## 2019-06-07 DIAGNOSIS — M96.1: ICD-10-CM

## 2019-06-07 DIAGNOSIS — M51.16: Primary | ICD-10-CM

## 2019-06-07 DIAGNOSIS — M48.061: ICD-10-CM

## 2019-06-07 PROCEDURE — G0463 HOSPITAL OUTPT CLINIC VISIT: HCPCS

## 2019-06-08 NOTE — PAIN
DATE OF SERVICE:  06/07/2019



PROGRESS NOTE FOR PAIN CLINIC



DIAGNOSES:  Lumbar radiculopathy with lumbar spinal stenosis, lumbar

degenerative disk disease and post-lumbar laminectomy syndrome.



HISTORY OF PRESENT ILLNESS:  The patient is a 63-year-old male who returns for

followup status post 1 week after spinal cord stimulator and generator implant. 

The patient did very well after the procedure.  The patient reports his coverage

is very good with near 100% improvement.  The patient reports he is healing up

well.  Still some soreness in the back and on the right side where the generator

was placed with the rest of the spinal cord stimulator system, but otherwise

doing quite well.  The patient reports he is very pleased with his progress.  He

has been increasing his activity with greater distance walking, doing activities

around the house with greater ease and comfort.  He is still being cautious of

not bending and flexing too much with his lower back or the upper back, but

otherwise is traveling as he wants and sleeping well at night.  The patient

reports no new changes.  The patient reports his pain is a 3 on a scale of 10 at

its worst over the past week, 2 on average and 0 at its least and is a 2 today. 

The patient reports just from this soreness.  He has not taken any pain

medication postoperatively since 1 day after the procedure and continues to do

well.  The patient reports no new motor or sensory deficits, no bowel or bladder

incontinence or other complaints.



PHYSICAL EXAMINATION:

VITAL SIGNS:  The patient's blood pressure is 137/86, pulse 73, respirations 18,

temperature 98.1 degrees Fahrenheit, height is 5 feet 7-1/2 inches, weight is

264 pounds.

GENERAL:  The patient is awake, alert, oriented, appropriate, very pleasant

demeanor.

HEENT:  Shows normocephalic, atraumatic.  Extraocular movements are intact and

symmetrical.  Oral cavity:  Mucous membranes are moist and pink.  Dentition is

intact.

NECK:  Shows anterior throat supple.

CHEST:  Shows breath sounds clear.

HEART:  Shows S1, S2 clear.

ABDOMEN:  Soft, obese, nontender.

BACK:  Shows spine grossly in the midline.  Well-healing surgical scars noted

with a Steri-Strips tapes in both the midline and right paramedian from the

generator placement, which is without erythema.  No significant tenderness to

palpation.  No drainage.  The patient shows good rotational motion of lumbar

spine, both laterally as well as extension and flexion, but only limited

secondary to the new stimulator placement and we ____ him and asked him not to

flex or extend to any repetitive motions for the next 6 weeks.

EXTREMITIES:  The patient's lower extremities show deep tendon reflexes at 1+

patellar and tendo-calcaneus tendons.  Motor exam is strong with 5/5

dorsiflexion and extension.



Options were discussed with the patient.  The patient's old chart was reviewed

as his current medication regimen updated.  Current review of systems updated

today as well.  We will redress the patient's wound again showing very good

signs of healing one week postop.  The patient will increase activities very

gradually or can be cautious with stretching, extension and flexion abilities

with the mid and low back for the next 6 weeks.  The patient will follow up

currently on as needed basis and will follow up in approximately 1 month for

postop check at that time as well.

 



______________________________

JEN CASTRO MD



DR:  MADDIE/keara  JOB#:  [id number] / [id number]

DD:  06/07/2019 10:52  DT:  06/08/2019 00:51

## 2019-10-18 ENCOUNTER — HOSPITAL ENCOUNTER (OUTPATIENT)
Dept: HOSPITAL 63 - PMG | Age: 64
Discharge: HOME | End: 2019-10-18
Attending: PHYSICIAN ASSISTANT
Payer: COMMERCIAL

## 2019-10-18 DIAGNOSIS — M25.551: Primary | ICD-10-CM

## 2019-10-18 PROCEDURE — 73502 X-RAY EXAM HIP UNI 2-3 VIEWS: CPT

## 2019-10-18 NOTE — RAD
AP view of the pelvis and two-view study of the right hip

 

Clinical indications: Right hip pain.

 

FINDINGS: Hip joints are symmetric. There is no significant arthritic 

change present. No acute fracture or dislocation or lytic process the 

right hip is evident. No lytic process or acute fracture of the pelvic 

bones is seen and no diastases is evident.

 

IMPRESSION: No significant osseous abnormality.

 

Electronically signed by: Jamie Mosley MD (10/18/2019 4:22 PM) 

[facility]

## 2020-05-10 ENCOUNTER — HOSPITAL ENCOUNTER (EMERGENCY)
Dept: HOSPITAL 63 - ER | Age: 65
Discharge: HOME | End: 2020-05-10
Payer: MEDICARE

## 2020-05-10 VITALS — WEIGHT: 264.55 LBS | BODY MASS INDEX: 40.09 KG/M2 | HEIGHT: 68 IN

## 2020-05-10 VITALS
SYSTOLIC BLOOD PRESSURE: 137 MMHG | DIASTOLIC BLOOD PRESSURE: 86 MMHG | SYSTOLIC BLOOD PRESSURE: 137 MMHG | DIASTOLIC BLOOD PRESSURE: 86 MMHG
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
## 2020-05-10 DIAGNOSIS — M54.40: ICD-10-CM

## 2020-05-10 DIAGNOSIS — S70.02XA: Primary | ICD-10-CM
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
## 2020-05-10 DIAGNOSIS — G89.29: ICD-10-CM

## 2020-05-10 DIAGNOSIS — S70.01XA: ICD-10-CM
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
## 2020-05-10 PROCEDURE — 72192 CT PELVIS W/O DYE: CPT

## 2020-05-10 PROCEDURE — 72131 CT LUMBAR SPINE W/O DYE: CPT

## 2020-05-10 NOTE — RAD
CT LUMBAR SPINE WO CONTRAST, CT PELVIS WO CONTRAST

 

History:Fall. Pain.

 

Technique: Noncontrast CT was performed of the lumbar spine and pelvis. 

Multiplanar reconstructions were performed.

 

Exposure: One or more of the following individualized dose reduction 

techniques were utilized for this examination:  

1. Automated exposure control  

2. Adjustment of the mA and/or kV according to patient size  

3. Use of iterative reconstruction technique.

 

Comparison: Lumbar spine MRI September 24, 2014

 

Findings:

Lumbar spine CT:

Postoperative changes L4-5 posterior stabilization and interbody fusion. 

Spinal stimulator leads noted. Right-sided laminectomy L3-L4.

 

Grade 1 anterolisthesis L4 on L5. Mild retrolisthesis L2 on L3. Mild 

leftward curvature the lumbar spine.

 

Normal vertebral body height. No fracture. Advanced multilevel 

degenerative disc changes most prominent L2-L3, L3-L4 and L5-S1. Moderate 

canal narrowing L2-L3. Mild canal narrowing L3-L4. Multilevel 

neuroforaminal narrowing most prominent right L4-L5 and bilateral L5-S1.

 

CT pelvis:

Large fat-containing umbilical hernia with fascial defect measuring 5.3 

cm. Colonic diverticulosis. Atheromatous plaque throughout the 

nonaneurysmal abdominal aorta and branch vessels.

 

Normal alignment of the hips. No fracture.

 

Impression: 

1.  No acute osseous abnormality.

2.  Advanced lumbar spondylosis with multilevel canal and neuroforaminal 

narrowing.

3.  Postoperative changes L4-L5.

4.  Large fat-containing umbilical hernia.

 

Electronically signed by: Tip Luna DO (5/10/2020 7:50 PM) [facility]

## 2020-05-10 NOTE — PHYS DOC
General Adult


EDM:


Chief Complaint:  MECHANICAL FALL





HPI:


HPI:


"..I ve been having problems with bursitis in my hip... at lst the though I 

pulled a muscle.. then I ve had injection s of steroids.. x 2.. the last time 

did not help... then today I slipped and fell...."








Patient is a 64 year old male who presents with above hx and complaints of 

bilateral hip pain after trip and fall.  Pt. has had problems with hip pain for 

some time.  Has undergone 2 episodes of steroid injections to treat bursitis in 

hips.  Patient has been amatory since fall but is painful.  Patient has a 

history of chronic back pain and sciatica.  Patient normally follows with 

[facility] for care.   Patient denies any traumas of ill contacts.  No history 

immunosuppression.  No history of fever or chills.  No problems with defecation 

or urination since fall.





Review of Systems:


Review of Systems:


Constitutional:  Denies fever or chills 


Eyes:  Denies change in visual acuity 


HENT:  Denies nasal congestion or sore throat 


Respiratory:  Denies cough or shortness of breath 


Cardiovascular:  Denies chest pain or edema 


GI:  Denies abdominal pain, nausea, vomiting, bloody stools or diarrhea 


:  Denies dysuria 


Musculoskeletal: Complains of bilateral hip pain


Integument:  Denies rash 


Neurologic:  Denies headache, focal weakness or sensory changes 


Endocrine:  Denies polyuria or polydipsia 


Lymphatic:  Denies swollen glands 


Psychiatric:  Denies depression or anxiety





Heart Score:


Risk Factors:


Risk Factors:  DM, Current or recent (<one month) smoker, HTN, HLP, family 

history of CAD, obesity.


Risk Scores:


Score 0 - 3:  2.5% MACE over next 6 weeks - Discharge Home


Score 4 - 6:  20.3% MACE over next 6 weeks - Admit for Clinical Observation


Score 7 - 10:  72.7% MACE over next 6 weeks - Early Invasive Strategies





Family History:


Family History:


Noncontributory to presentation





Current Medications:


Current Meds:


See nursing for home meds





Allergies:


Allergies:


No known drug allergies





Physical Exam:


PE:





Constitutional: Moderate acute distress, non-toxic appearance. []


HENT: Normocephalic, atraumatic, bilateral external ears normal, oropharynx 

moist, no oral exudates, nose normal. []


Eyes: PERRLA, EOMI, conjunctiva normal, no discharge. [] 


Neck: Normal range of motion, no tenderness, supple, no stridor. [] 


Cardiovascular:Heart rate regular rhythm, no murmur []


Lungs & Thorax:  Bilateral breath sounds clear to auscultation []


Abdomen: Bowel sounds normal, soft, no tenderness, no masses, no pulsatile 

masses. [] 


Skin: Warm, dry, no erythema, no rash. [] 


Back: No tenderness, no CVA tenderness. [] Old surgery scar


Extremities: Bilateral hip tenderness, no cyanosis, no clubbing, ROM intact, no 

edema. [] 


Neurologic: Alert and oriented X 3, normal motor function, normal sensory 

function, no focal deficits noted. []


Psychologic: Affect normal, judgement normal, mood normal. []





EKG:


EKG:


[]





Radiology/Procedures:


Radiology/Procedures:


[]


                                     Signed





PATIENT: KENDY ALEX ACCOUNT: [id number]     MRN#: [id number]


: 1955           LOCATION: ER              AGE: 64


SEX: M                    EXAM DT: 05/10/20         ACCESSION#: [id number]


STATUS: REG ER            ORD. PHYSICIAN: SHELBY CUETO MD


REASON: fall


PROCEDURE: CT LUMBAR SPINE WO CONTRAST





CT LUMBAR SPINE WO CONTRAST, CT PELVIS WO CONTRAST


 


History:Fall. Pain.


 


Technique: Noncontrast CT was performed of the lumbar spine and pelvis. 


Multiplanar reconstructions were performed.


 


Exposure: One or more of the following individualized dose reduction 


techniques were utilized for this examination:  


1. Automated exposure control  


2. Adjustment of the mA and/or kV according to patient size  


3. Use of iterative reconstruction technique.


 


Comparison: Lumbar spine MRI 2014


 


Findings:


Lumbar spine CT:


Postoperative changes L4-5 posterior stabilization and interbody fusion. 


Spinal stimulator leads noted. Right-sided laminectomy L3-L4.


 


Grade 1 anterolisthesis L4 on L5. Mild retrolisthesis L2 on L3. Mild 


leftward curvature the lumbar spine.


 


Normal vertebral body height. No fracture. Advanced multilevel 


degenerative disc changes most prominent L2-L3, L3-L4 and L5-S1. Moderate 


canal narrowing L2-L3. Mild canal narrowing L3-L4. Multilevel 


neuroforaminal narrowing most prominent right L4-L5 and bilateral L5-S1.


 


CT pelvis:


Large fat-containing umbilical hernia with fascial defect measuring 5.3 


cm. Colonic diverticulosis. Atheromatous plaque throughout the 


nonaneurysmal abdominal aorta and branch vessels.


 


Normal alignment of the hips. No fracture.


 


Impression: 


1.  No acute osseous abnormality.


2.  Advanced lumbar spondylosis with multilevel canal and neuroforaminal 


narrowing.


3.  Postoperative changes L4-L5.


4.  Large fat-containing umbilical hernia.


 


Electronically signed by: Tip Callahan DO (5/10/2020 7:50 PM) [facility]














DICTATED AND SIGNED BY:     TIP CALLAHAN DO


DATE:     05/10/20 1950





CC: SHELBY CUETO MD; [facility] ~





Course & Med Decision Making:


Course & Med Decision Making


Pertinent Labs and Imaging studies reviewed. (See chart for details)





Patient use ice packs as needed.  Take ibuprofen for pain.  For marked pain may 

take Vicoprofen.  Follow-up with primary care.  Keep follow-up with orthopedics.

  Return if any concerns.





Impression:





1.  Fall


2.  Bilateral contusions hips.


3.  Chronic low back pain-sciatica  ( multilevel canal and neuro foraminal 

narrowing)





[]





Dragon Disclaimer:


Dragon Disclaimer:


This electronic medical record was generated, in whole or in part, using a voice

 recognition dictation system.





Departure


Departure:


Disposition:  01 HOME/RESIDENCE PRIOR TO ADM


Condition:  STABLE


Referrals:  


RE[facility] (PCP)


Scripts


Hydrocodone/Ibuprofen (HYDROCODONE-IBUPROFEN 7.5-200  **) 1 Each Tablet


2 TAB PO PRN Q6HRS PRN for PAIN, #30 TAB 0 Refills


   Prov: SHELBY CUETO MD         5/10/20





Dragon Disclaimer


This chart was dictated in whole or in part using Voice Recognition software in 

a busy, high-work load, and often noisy Emergency Department environment.  It 

may contain unintended and wholly unrecognized errors or omissions.











SHELBY CUETO MD           May 10, 2020 18:26

## 2020-05-10 NOTE — RAD
CT LUMBAR SPINE WO CONTRAST, CT PELVIS WO CONTRAST

 

History:Fall. Pain.

 

Technique: Noncontrast CT was performed of the lumbar spine and pelvis. 

Multiplanar reconstructions were performed.

 

Exposure: One or more of the following individualized dose reduction 

techniques were utilized for this examination:  

1. Automated exposure control  

2. Adjustment of the mA and/or kV according to patient size  

3. Use of iterative reconstruction technique.

 

Comparison: Lumbar spine MRI September 24, 2014

 

Findings:

Lumbar spine CT:

Postoperative changes L4-5 posterior stabilization and interbody fusion. 

Spinal stimulator leads noted. Right-sided laminectomy L3-L4.

 

Grade 1 anterolisthesis L4 on L5. Mild retrolisthesis L2 on L3. Mild 

leftward curvature the lumbar spine.

 

Normal vertebral body height. No fracture. Advanced multilevel 

degenerative disc changes most prominent L2-L3, L3-L4 and L5-S1. Moderate 

canal narrowing L2-L3. Mild canal narrowing L3-L4. Multilevel 

neuroforaminal narrowing most prominent right L4-L5 and bilateral L5-S1.

 

CT pelvis:

Large fat-containing umbilical hernia with fascial defect measuring 5.3 

cm. Colonic diverticulosis. Atheromatous plaque throughout the 

nonaneurysmal abdominal aorta and branch vessels.

 

Normal alignment of the hips. No fracture.

 

Impression: 

1.  No acute osseous abnormality.

2.  Advanced lumbar spondylosis with multilevel canal and neuroforaminal 

narrowing.

3.  Postoperative changes L4-L5.

4.  Large fat-containing umbilical hernia.

 

Electronically signed by: Tip Luna DO (5/10/2020 7:50 PM) [facility]

## 2020-05-18 ENCOUNTER — HOSPITAL ENCOUNTER (OUTPATIENT)
Dept: HOSPITAL 61 - MRI | Age: 65
Discharge: HOME | End: 2020-05-18
Attending: ORTHOPAEDIC SURGERY
Payer: MEDICARE

## 2020-05-18 DIAGNOSIS — M25.551: Primary | ICD-10-CM

## 2020-05-18 PROCEDURE — 73721 MRI JNT OF LWR EXTRE W/O DYE: CPT

## 2020-05-18 NOTE — RAD
Study: MRI of the right hip without contrast

 

INDICATION: Right hip pain.

 

COMPARISON: No prior hip MRI is available for review.

 

TECHNIQUE: Multiplanar MR imaging of the right hip performed without the 

use of intravenous or intra-articular contrast.

 

FINDINGS:

 

The study is significantly degraded due to limitations with sequence 

acquisition in the presence of a spinal cord stimulator device. This 

renders the T2-weighted sequences nearly nondiagnostic. 

 

No fracture or aggressive marrow signal abnormality is seen on the coronal

T1 sequence. No severe hip joint space narrowing. There appears to be 

fluid along the lateral aspect of the proximal right femur extending from 

the region of the greater trochanteric bursa fluid signal also seen along 

the gluteus medius tendon as it approaches the greater trochanter possibly

in part relating to partial tearing of this tendon.

 

IMPRESSION:

1.  Severely limited study due to the presence of a spinal cord 

stimulator. The study is nearly nondiagnostic.

2.  There appears to be fluid signal along the lateral aspect of the 

proximal femur a portion of which is in the region of the greater 

trochanteric bursa. There is also fluid signal along the gluteus medius 

tendon as it approaches the greater trochanter. It is uncertain whether 

this fluid signal is reactive or related to trauma. CT could be of benefit

to further assess noting that CT is of limited utility for tendon injury.

 

Electronically signed by: MERCY CAMARILLO MD (5/18/2020 5:15 PM) [id number]

## 2020-08-17 ENCOUNTER — HOSPITAL ENCOUNTER (OUTPATIENT)
Dept: HOSPITAL 61 - PNCL | Age: 65
Discharge: HOME | End: 2020-08-17
Attending: ANESTHESIOLOGY
Payer: MEDICARE

## 2020-08-17 DIAGNOSIS — Z87.891: ICD-10-CM

## 2020-08-17 DIAGNOSIS — M51.16: ICD-10-CM

## 2020-08-17 DIAGNOSIS — M48.061: ICD-10-CM

## 2020-08-17 DIAGNOSIS — Z79.899: ICD-10-CM

## 2020-08-17 DIAGNOSIS — M70.71: Primary | ICD-10-CM

## 2020-08-17 DIAGNOSIS — Z88.8: ICD-10-CM

## 2020-08-17 PROCEDURE — 20610 DRAIN/INJ JOINT/BURSA W/O US: CPT

## 2020-08-17 PROCEDURE — 77002 NEEDLE LOCALIZATION BY XRAY: CPT

## 2020-08-17 PROCEDURE — 20600 DRAIN/INJ JOINT/BURSA W/O US: CPT

## 2020-08-17 NOTE — PDOC
Progress Note - Pain Clinic


Date of Service:


DOS:


DATE: 8/17/20 


TIME: 09:57





Diagnosis:


Dx:


Lumbar radiculopathy with lumbar degenerative disc disease spinal stenosis and 

post lumbar laminectomy syndrome


Right ischial bursitis





History or Present Illness:


HPI:


64-year-old male returns for follow-up status post plantar cord stimulator 

placement last seen June 2019 patient reports doing very well with the 

stimulator has had it reprogrammed several times he has been significant pain in

the right hip which the programming was not able to cover.  Patient reports the 

pain is started in the right hip about 4 months ago on May 10 he fell on his 

right hip making the pain even worse patient is had several with trochanteric 

bursa injections but with only minimal decrease in pain.  Patient ports pain now

is more in the butt where he sits on the right side only.  Patient is been using

pillows as well as donuts to try get the pressure off the area where he sits 

describes pain is aching and sharp shooting radiating becoming constant he has 

been taking nonsteroidal anti-inflammatories as well as hydrocodone without 

significant decrease in pain long-term.  Rates pain as a 8 on a scale of 10 is 

worse of the past week 6 on average and a 4 to least is a 6 today.  Reports been

waking up from sleep recently but with regard to stimulator is doing much better

near 100% improvement from his radicular pain.  Patient reports no new motor or 

sensory deficits no new bowel or bladder incontinence.





Physical Exam:


VS:


Blood pressure is 130/91 pulse 78 respirations 18 temperature 98.2 F height is 

5 foot 7-1/2 inches weight is 258 pounds


PE:


PHYSICAL EXAMINATION:





GENERAL: The patient is awake, alert, oriented, appropriate, very pleasant 

demeanor


HEENT: Shows normocephalic, atraumatic.  Extraocular movements are intact and 

symmetrical.  Oral cavity: Mucous membranes moist and pink.  Dentition is 

intact.


NECK: Shows anterior throat supple without palpable lymphadenopathy noted.  

Swallow reflex symmetrical.


CHEST: Shows normal on inspection.  Breath sounds are clear bilaterally, no 

rales rhonchi or wheezes auscultated.


HEART: Shows S1, S2 clear.  No murmurs auscultated.


ABDOMEN: Soft, nontender, nondistended, obese.  No palpable organomegaly is 

noted.  No rebound or guarding demonstrated.


BACK: Shows spine grossly in the midline.  Normal-appearing cervical lordotic 

curvature.  There is slightly increased thoracic kyphosis, some minor flattening

of the lumbar lordotic curvature.  Lumbar paraspinous muscles show symmetrical 

on inspection, with well-healed surgical scar noted the midline as well as 

easily palpable spinal cord stimulator generator to the right of midline in the 

paraspinous distribution with well-healed surgical scar as well.  On palpation 

shows some moderate tenderness diffusely throughout the upper, middle and lower 

distribution of the paraspinous muscles bilaterally and also into the lower 

thoracic paraspinous musculature, firm and tender, but without specific trigger 

points, without radiation of pain.  The patient has good rotational motion of 

the lumbar spine, both laterally as well as extension and flexion without signif

icant difficulty.  No tenderness over the spinous processes, sacrum or 

sacroiliac regions.


EXTREMITIES: Lower extremities show deep tendon reflexes 1+ in the patellar and 

tendo calcaneus tendons.  Motor exam is 5 on a scale of 5 with right 

dorsiflexion, extension, quadriceps and hamstring flexion and 5/5 on the left.  

Peripheral pulses are 1+ posterior tibial.  No peripheral edema is noted 

bilaterally.  Lower extremities are warm and dry to touch, equal in color and 

appearance.  Gaenslen's and Rasheed's maneuvers are negative bilaterally as 

well.  Patient's right gluteus shows significant tenderness over the right 

ischio tuberosity with direct palpation.  Patient shows no significant radiation

pain but very tender no tenderness on the left side.  This is worse with 

standing and stretching and hip flexion on the right only.


SKIN: Shows warm and dry, good turgor.  No edema.  No sores, rashes or bruising 

throughout.





Procedure:


Procedure:


Options discussed with the patient.  Patient will chart reviewed his his current

medication regimen updated current review of systems updated today as well.  We 

discussed options and will proceed with a right ischial bursa injection with 

fluoroscopic guidance today.  Risks were discussed including but not limited to 

bleeding infection possibility of intravascular injection sequelae spread local 

anesthetic and numbness side effects of steroid medication exposure to 

fluoroscopy and poor results chronic pain control.  Patient understands wished 

to proceed patient return to clinic in approximately 2 weeks for follow-up was 

counseled as to return appointment activity level, and side effects to be aware 

of.





Medication Injected:


Med Injected:


Under sterile prep and drape patient in the left lateral decubitus position 

using C-arm fluoroscopic guidance right-sided ischio tuberosity was identified 

and using 25-gauge 1-1/2 inch needle and local anesthetic was contacted in the 

tender spot of the inferior ischio tuberosity.  1.5 cc of contrast was injected 

with good spread in the ischio tuberosity bursa on the right without washout.  

At this time 80 mg Depo-Medrol and 5 cc 0.25% bupivacaine was then injected with

negative aspiration prior to injection.  Patient tolerated the procedure well 

had no complications.





Condition at Discharge:


Condition at Discharge:


Condition at discharge stable patient on the procedure well had no 

complications.











JEN CASTRO MD               Aug 17, 2020 10:03

## 2020-10-07 ENCOUNTER — HOSPITAL ENCOUNTER (OUTPATIENT)
Dept: HOSPITAL 61 - PNCL | Age: 65
End: 2020-10-07
Attending: ANESTHESIOLOGY
Payer: MEDICARE

## 2020-10-07 DIAGNOSIS — E11.9: ICD-10-CM

## 2020-10-07 DIAGNOSIS — Z80.51: ICD-10-CM

## 2020-10-07 DIAGNOSIS — Z80.1: ICD-10-CM

## 2020-10-07 DIAGNOSIS — M46.1: ICD-10-CM

## 2020-10-07 DIAGNOSIS — M51.16: ICD-10-CM

## 2020-10-07 DIAGNOSIS — M48.061: ICD-10-CM

## 2020-10-07 DIAGNOSIS — M70.71: Primary | ICD-10-CM

## 2020-10-07 DIAGNOSIS — I10: ICD-10-CM

## 2020-10-07 DIAGNOSIS — Z79.84: ICD-10-CM

## 2020-10-07 DIAGNOSIS — F17.210: ICD-10-CM

## 2020-10-07 DIAGNOSIS — Z79.899: ICD-10-CM

## 2020-10-07 DIAGNOSIS — Z88.8: ICD-10-CM

## 2020-10-07 PROCEDURE — 20600 DRAIN/INJ JOINT/BURSA W/O US: CPT

## 2020-10-07 PROCEDURE — 77002 NEEDLE LOCALIZATION BY XRAY: CPT

## 2020-10-07 NOTE — PDOC
Progress Note - Pain Clinic


Date of Service:


DOS:


DATE: 10/7/20 


TIME: 10:33





Diagnosis:


Dx:


Lumbar radiculopathy with lumbar degenerative disc disease lumbar spinal 

stenosis and lumbar postlaminectomy syndrome


Right ischial bursitis





History or Present Illness:


HPI:


64-year-old male returns follow-up status post right ischial bursa injection 

with near under percent improvement for about 2 months patient reports that the 

pain again returned over the past week in the low hip and right side with 

sitting primarily especially on hard surfaces also present with walking changes 

standing changing positions getting up from seated position.  Patient reports is

aching and sharp at times radiating constant into the posterior gluteus but not 

into the leg patient reports is 7 on scale 10 is worse over the past week 6 on 

average 5-6 is a 5 today patient reports is generally not awaken him from sleep 

at night usually much better with distance walking sitting for prolonged periods

doing work and household activities with much greater ease and comfort until the

last week or so the pain is beginning to return.  Patient reports he is getting 

ready for his daughter's wedding in about another week and a half and will be 

sitting for prolonged periods and would like to get treated prior to this.  Pat

patrice reports no new motor or sensory deficits no new bowel or bladder con's or 

other complaints.





Physical Exam:


VS:


Blood pressure is 179/96 pulse 74 respirations are 18 temperature is 98.7 F 

height is 5 feet 7 inches weight is 253 pounds


PE:


PHYSICAL EXAMINATION:





GENERAL: The patient is awake, alert, oriented, appropriate, very pleasant 

demeanor


HEENT: Shows normocephalic, atraumatic.  Extraocular movements are intact and 

symmetrical.  Oral cavity: Mucous membranes moist and pink.  


NECK: Shows anterior throat supple without palpable lymphadenopathy noted.  

Swallow reflex symmetrical.


CHEST: Shows normal on inspection.  Breath sounds are clear bilaterally, no 

rales rhonchi wheezes auscultated.


HEART: Shows S1, S2 clear.  No murmurs auscultated.


ABDOMEN: Soft, nontender, nondistended, obese.  No palpable organomegaly is 

noted.  No rebound or guarding demonstrated.


BACK: Shows spine grossly in the midline.  Normal-appearing cervical lordotic 

curvature.  There is slightly increased thoracic kyphosis, some minor flattening

of the lumbar lordotic curvature.  Lumbar paraspinous muscles show symmetrical 

on inspection, with well-healed surgical scarring and easily palpable spinal 

cord stimulator to the right of midline in the lumbar distribution.  On 

palpation shows some moderate tenderness diffusely throughout the upper, middle 

and lower distribution of the paraspinous muscles bilaterally, but without 

specific trigger points, without radiation of pain.  The patient has good 

rotational motion of the lumbar spine, both laterally as well as extension and 

flexion without significant difficulty.  No tenderness over the spinous 

processes, sacrum or sacroiliac regions.  Patient's right ischio tuberosity 

shows significant tenderness with palpation over the inferior aspect of the 

ischio without specific radiation left side is nontender.


EXTREMITIES: Lower extremities show deep tendon reflexes 1+ in the patellar and 

tendo calcaneus tendons.  Motor exam is 5 on a scale of 5 with right 

dorsiflexion, extension, quadriceps and hamstring flexion and 5/5 on the left.  

Peripheral pulses are 1+ posterior tibial.  No peripheral edema is noted 

bilaterally.  Lower extremities are warm and dry to touch, equal in color and 

appearance.


SKIN: Shows warm and dry, good turgor.  No edema.  No sores, rashes or bruising 

throughout.





Procedure:


Procedure:


Options were discussed with the patient.  Patient chart reviewed his his current

medication regimen updated current review of systems updated today as well.  We 

will proceed with a right ischial bursa injection with fluoroscopic guidance.  

Risks were discussed including but not limited to bleeding infection possibility

of extravasation of local anesthetic and numbness exposure fluoroscopy as well 

as poor results regarding pain control.  Patient understands wished to proceed. 

Patient return to clinic in approximately 2 weeks for follow-up was counseled 

return appointment active level and side effects to be aware of.





Medication Injected:


Med Injected:


Under sterile prep and drape patient in left lateral decubitus position using C-

arm fluoroscopic guidance patient's left ischio tuberosity was identified and 

using a 25-gauge needle was under direct fluoroscopic guidance inserted to 

contact the right ischio tuberosity in the inferior medial aspect negative aspir

ation was noted and at this time 1 cc of contrast was injected with good local 

spread without washout or uptake.  At this time concentration of 5 cc 0.25% 

ropivacaine 80 mg Depo-Medrol was then injected without difficulty needle was 

withdrawn and sterile bandage was applied.  Patient tolerated procedure well had

no immediate complications.





Condition at Discharge:


Condition at Discharge:


Condition at discharge stable patient tolerated procedure well had no 

complications.











JEN CASTRO MD                Oct 7, 2020 10:38

## 2020-11-30 ENCOUNTER — HOSPITAL ENCOUNTER (OUTPATIENT)
Dept: HOSPITAL 63 - DXRAD | Age: 65
End: 2020-11-30
Attending: PHYSICIAN ASSISTANT
Payer: MEDICARE

## 2020-11-30 DIAGNOSIS — M41.86: ICD-10-CM

## 2020-11-30 DIAGNOSIS — M47.817: Primary | ICD-10-CM

## 2020-11-30 DIAGNOSIS — M25.78: ICD-10-CM

## 2020-11-30 PROCEDURE — 72110 X-RAY EXAM L-2 SPINE 4/>VWS: CPT

## 2020-11-30 NOTE — RAD
EXAM: Lumbar spine, 7 views.

 

HISTORY: Pain.

 

COMPARISON: CT dated 5/10/2020.

 

FINDINGS: Frontal, lateral, bilateral oblique and coned sacral views of 

the lumbar spine are obtained. There is instrumented posterior spinal 

fusion and disc space fusion device placement at L4-L5. There is lumbar 

levoscoliosis centered at L2-L3. There is grade 1 anterolisthesis of L4 on

L5, measuring 4 mm. There is multilevel endplate remodeling with disc 

space narrowing, osteophyte ptosis and vacuum phenomenon. There is 

multilevel facet arthropathy, primarily at L4-L5 and L5-S1. There are 

dorsal column stimulator leads extending cephalad over the thoracic spine 

the on the field-of-view. There is an ectatic atherosclerotic abdominal 

aorta.

 

IMPRESSION: 

1. Multilevel degenerative change involving the lumbar spine, described 

above.

2. Instrumented fusion at L4-L5.

3. Lumbar scoliosis and grade 1 anterolisthesis of L4 on L5.

 

Electronically signed by: Ruth Ann Geronimo MD (11/30/2020 9:19 AM) [facility]

## 2021-04-27 NOTE — PDOC
Progress Note - Pain Clinic


Date of Service:


DOS:


DATE: 4/27/21 


TIME: 09:35





Diagnosis:


Dx:


Lumbar radiculopathy with lumbar degenerative disc disease and lumbar spinal 

stenosis with post lumbar laminectomy syndrome


Spinal cord stimulator


Right ischial bursitis


Left greater trochanteric bursitis





History or Present Illness:


HPI:


65-year-old male returns for follow-up status post right ischial bursa injection

last seen October 7, 2020.  Patient reports doing very well with the right side 

but his main complaint is left lateral and posterior hip pain.  Patient reports 

it started up about a month or so ago and has been getting worse with walking 

standing no pain specifically in the low back and has been doing very well with 

his stimulator which is keeping a charge it is doing very well he tried changing

the program to cover some of the left hip pain but it was not adequate patient 

reports he went back to his regular program and is having good control with the 

back and leg pain with the stimulator patient rates his pain now is in the 

lateral aspect of the hip on the left side rated as an 8 on scale 10 is worse 

over the past week 5 on average 4 to sleep in the 5 today patient ports aching 

sharp shooting at times on the lateral aspect of the thigh with walking and 

standing stabbing sensation in the low back and hip radiating can be constant 

severe with extended standing or walking patient reports no new motor or sensory

deficits no new bowel or bladder incontinence reports generally sleeping pretty 

well but if he lays on his left side it does awaken him about every 3 hours.





Physical Exam:


VS:


Blood pressure is 135/84 pulse 70 respirations 16 temperature 97.9 F height is 

5 feet 7-1/2 inches weight is 212 pounds


PE:


PHYSICAL EXAMINATION:





GENERAL: The patient is awake, alert, oriented, appropriate, very pleasant 

demeanor


HEENT: Shows normocephalic, atraumatic.  Extraocular movements are intact and 

symmetrical.  Oral cavity: Mucous membranes moist and pink.  Dentition is 

intact.


NECK: Shows anterior throat supple without palpable lymphadenopathy noted.  

Swallow reflex symmetrical.


CHEST: Shows normal on inspection.  Breath sounds are clear bilaterally, no 

rales rhonchi or wheezes auscultated.


HEART: Shows S1, S2 clear.  No murmurs auscultated.


ABDOMEN: Soft, nontender, nondistended, obese.  No palpable organomegaly is 

noted.  


BACK: Shows spine grossly in the midline.  Normal-appearing cervical lordotic 

curvature.  There is slightly increased thoracic kyphosis, some minor flattening

of the lumbar lordotic curvature.  Well-healed surgical scarring is noted once 

again in the lumbar distribution.  Lumbar paraspinous muscles show symmetrical 

on inspection, on palpation shows some moderate tenderness diffusely throughout 

the upper, middle and lower distribution of the paraspinous muscles, but without

specific trigger points, without radiation of pain.  The patient has good 

rotational motion of the lumbar spine, both laterally as well as extension and 

flexion without significant difficulty.  No tenderness over the spinous 

processes, sacrum or sacroiliac regions.


EXTREMITIES: Lower extremities show deep tendon reflexes 1+ in the patellar and 

tendo calcaneus tendons.  Motor exam is 5 on a scale of 5 with right 

dorsiflexion, extension, quadriceps and hamstring flexion and 5/5 on the left.  

Peripheral pulses are 1+ posterior tibial.  No peripheral edema is noted 

bilaterally.  Lower extremities are warm and dry to touch, equal in color and 

appearance.  Patient's left hip shows significant tenderness over the left 

greater trochanter with palpation with significant pain with mild radiation into

the posterior gluteus as well as into the lateral thigh with deep palpation.  

Right side is nontender.


SKIN: Shows warm and dry, good turgor.  No edema.  No sores, rashes or bruising 

throughout.





Procedure:


Procedure:


Options were discussed with the patient.  Patient chart was reviewed his 

medication regimen updated current review of systems updated today as well.  We 

will proceed with a fluoroscopically guided left greater trochanteric bursa 

injection.  Risks are discussed including but not limited to bleeding infection 

possibility of intravascular injection sequelae spread local anesthetic numbness

side effects steroid medication exposure fluoroscopy and portal scarring pain 

control.  Patient understands wished to proceed patient will return to the 

clinic in approximately 2 weeks for follow-up, was counseled as to return 

appointment activity level and side effects to be aware of.





Medication Injected:


Med Injected:


Under sterile prep and drape patient in the right lateral decubitus position, 

left greater trochanter was visualized with an using a 25-gauge needle was 

guided under direct fluoroscopic vision to the greater trochanteric bursa.  

Negative aspiration was identified and 1.5 cc of contrast was injected with good

spread in the left greater trochanteric bursa without washout or uptake.  At 

this time, solution of 4 cc 0.25% bupivacaine and 80 mg Depo-Medrol was then 

injected.  Needle was removed sterile bandage was applied.  Patient tolerated 

procedure well had no complications.





Condition at Discharge:


Condition at Discharge:


Condition at discharge stable, patient alert procedure well and had no 

complications.











JEN CASTRO MD               Apr 27, 2021 09:39

## 2021-04-27 NOTE — PDOC4
PROCEDURE


Procedure


Patient was consented for left greater trochanteric bursa injection with flu

oroscopic guidance.  Risks were discussed including but not limited to bleeding 

infection possibility of intravascular injection sequelae spread of local 

anesthetic and numbness side effects steroid medication exposure fluoroscopy and

portals regarding pain control.  Patient understands wished to proceed.


Under sterile prep and drape patient in the right lateral decubitus position, 

left greater trochanter was visualized with an using a 25-gauge needle was 

guided under direct fluoroscopic vision to the greater trochanteric bursa.  

Negative aspiration was identified and 1.5 cc of contrast was injected with good

spread in the left greater trochanteric bursa without washout or uptake.  At 

this time, solution of 4 cc 0.25% bupivacaine and 80 mg Depo-Medrol was then 

injected.  Needle was removed sterile bandage was applied.  Patient tolerated 

procedure well had no complications.











JEN CASTRO MD               Apr 27, 2021 09:39

## 2021-05-12 NOTE — PDOC4
PROCEDURE


Procedure


Patient was consented for left piriformis injection with fluoroscopic guidance. 

Risk were discussed including but not limited to bleeding infection possibility 

of intravascular injection sequelae spread local anesthetic numbness side 

effects of steroid medication special fluoroscopy and portals regarding pain 

control.  Patient understands wished to proceed.


Under sterile prep and drape patient in the right lateral decubitus position 

using C-arm fluoroscopic guidance patient's left hip ischial tuberosity and soft

tissues were identified and over the area of significant tenderness overlying 

the piriformis musculature using 25-gauge needle and direct fluoroscopic 

visualization needle was advanced to the point of greatest discomfort and with 

some radiation into the lower extremity negative aspiration was noted at this 

point 2 cc of contrast was injected with good spread within the piriformis 

distribution but without uptake or washout.  This time solution containing 5 cc 

0.25% bupivacaine and 80 mg of Depo-Medrol was then injected.  Needle was 

withdrawn, sterile bandage was applied.  Patient tolerated the procedure well 

had no complications.











JEN CASTRO MD               May 12, 2021 08:39

## 2021-05-12 NOTE — PDOC
Progress Note - Pain Clinic


Date of Service:


DOS:


DATE: 5/12/21 


TIME: 08:34





Diagnosis:


Dx:


Left piriformis syndrome


Right ischial bursitis


Left greater trochanteric bursitis


Lumbar to colopathy with lumbar degenerative disc disease lumbar spinal stenosis

post lumbar laminectomy syndrome with spinal cord stimulator





History or Present Illness:


HPI:


65-year-old male returns follow-up status post left greater trochanteric bursa 

injection with good results but reports about 50% improvement now the pain is in

the posterior hip more in the gluteus but radiating into the posterior thigh 

posterior calf to the ankle patient report is worse with walking standing 

changing positions he can put pressure on the left side with sitting and 

reproduce the pain fairly readily.  Patient reports this is come up more over 

the past 2 to 3 weeks patient reports it is increasing with walking and standing

better with laying down or standing on his right side patient reports is an 8 on

scale 10 is worse over the past week 7 on average 5 its least is a 7 today 

patient reports aching sharp shooting stabbing radiating can be constant with 

pressure and sitting on the left side as well as walking.  Patient reports no 

new motor or sensory deficits no new bowel or bladder incontinence or other 

complaints





Physical Exam:


VS:


Blood pressure is 1 4486 pulse 85 respirations 16 temperature 97.8 F weight is 

205 pounds


PE:


PHYSICAL EXAMINATION:





GENERAL: The patient is awake, alert, oriented, appropriate, very pleasant 

demeanor


HEENT: Shows normocephalic, atraumatic.  Extraocular movements are intact and 

symmetrical.  Oral cavity: Mucous membranes moist and pink.  Dentition is 

intact.


NECK: Shows anterior throat supple without palpable lymphadenopathy noted.  Swa

llow reflex symmetrical.


CHEST: Shows normal on inspection.  Breath sounds are clear bilaterally, no 

rales or rhonchi.


HEART: Shows S1, S2 clear.  No murmurs auscultated.


ABDOMEN: Soft, nontender, nondistended, obese.  No palpable organomegaly is 

noted.  No rebound or guarding demonstrated.


BACK: Shows spine grossly in the midline.  Normal-appearing cervical lordotic 

curvature.  There is slightly increased thoracic kyphosis, some flattening of 

the lumbar lordotic curvature.  Well-healed surgical scarring is noted once 

again.  Lumbar paraspinous muscles show symmetrical on inspection, on palpation 

shows some moderate tenderness diffusely throughout the upper, middle and lower 

distribution of the paraspinous muscles ,but without specific trigger points, 

without radiation of pain.  The patient has good rotational motion of the lumbar

spine, both laterally as well as extension and flexion without significant 

difficulty.  No tenderness over the spinous processes, sacrum or sacroiliac 

regions.  With outpatient over the left inferior medial gluteus and piriformis 

region shows significant tenderness and reproducible pain into the left posteri

or thigh and to the posterior calf to the ankle with deep palpation.


EXTREMITIES: Lower extremities show deep tendon reflexes 1+ in the patellar and 

tendo calcaneus tendons.  Motor exam is 5 on a scale of 5 with right 

dorsiflexion, extension, quadriceps and hamstring flexion and 5/5 on the left.  

Peripheral pulses are 1+ posterior tibial.  No peripheral edema is noted 

bilaterally.  Lower extremities are warm and dry.


SKIN: Shows warm and dry, good turgor.  No edema.  No sores, rashes or bruising 

throughout.





Procedure:


Procedure:


Options were discussed with the patient.  Patient chart reviews his current 

medication regimen updated current review of systems updated today as well.  We 

will proceed with a left piriformis injection today with fluoroscopic guidance. 

Risks were discussed including but not limited to bleeding infection possibility

of intravascular injection sequelae spread local anesthetic numbness side 

effects steroid medication exposure fluoroscopy and portals rating pain control.

 Patient understands wished to proceed.  Patient will return to the clinic in 

approximately 2 weeks for follow-up, was counseled return appointment activity 

level, and side effects beware of.





Medication Injected:


Med Injected:


Under sterile prep and drape patient in the right lateral decubitus position 

using C-arm fluoroscopic guidance patient's left hip ischial tuberosity and soft

tissues were identified and over the area of significant tenderness overlying 

the piriformis musculature using 25-gauge needle and direct fluoroscopic 

visualization needle was advanced to the point of greatest discomfort and with 

some radiation into the lower extremity negative aspiration was noted at this 

point 2 cc of contrast was injected with good spread within the piriformis 

distribution but without uptake or washout.  This time solution containing 5 cc 

0.25% bupivacaine and 80 mg of Depo-Medrol was then injected.  Needle was 

withdrawn, sterile bandage was applied.  Patient tolerated the procedure well 

had no complications.





Condition at Discharge:


Condition at Discharge:


Condition at discharge stable, patient already the procedure well and had no 

complications.











JEN CASTRO MD               May 12, 2021 08:39

## 2021-06-28 NOTE — PDOC4
Procedure Note:


Procedure Note:


Patient was consented for left L5-S1 transforaminal epidural steroid injection. 

Risks were discussed including but not limited to: Bleeding, infection, 

possibility of epidural hematoma and subsequent neurological compromise, dural 

puncture, headaches, spinal cord and/or nerve damage, side effects of steroid 

medication, potential injection into the vertebral arteries at level and 

permanent ischemic damage, and poor results regarding pain control.  Patient 

understands and wished to proceed.


Under sterile prep and drape patient was placed in prone position using C-arm fl

uoroscopic guidance to identify the L5-S1 distribution oblique and slightly 

cephalad angled C arm.  The left L5-S1 target was identified and using lidocaine

for anesthetizing the skin 22-gauge Jackie pencil point needle was then used 

to enter the skin and into the subcutaneous tissues using direct C-arm 

fluoroscopic guidance to guide the needle into the transforaminal aspect of the 

left L5-S1 vertebrae this was confirmed with lateral views showing the needle 

tip in the superior aspect of the paravertebral region.  Aspiration was noted to

be negative, -1.5 cc of contrast was then injected with good spread both 

medially into the epidural space as well as laterally along the nerve root 

without uptake and without distribution and uptake on digital subtraction.  At 

this time, a solution containing 2 cc of 0.25% bupivacaine and 80 mg of Depo-

Medrol was then injected.  Needle was withdrawn and sterile bandage was applied.

 Patient tolerated procedure well had no immediate complications











JEN CASTRO MD               Jun 28, 2021 11:36

## 2021-06-28 NOTE — PDOC
Progress Note - Pain Clinic


Date of Service:


DOS:


DATE: 6/28/21 


TIME: 11:32





Diagnosis:


Dx:


Lumbar radiculopathy with lumbar degenerative disc disease lumbar spinal 

stenosis and post lumbar laminectomy syndrome


Right ischial bursitis


Left greater trochanteric bursitis


Left piriformis syndrome





History or Present Illness:


HPI:


65-year-old male returns for follow-up status post left piriformis injection May

12, 2021.  Patient reports this helped temporarily but only by about 25%.  

Patient reports now the pain is different is radiating completely down the leg 

into the posterior gluteus posterior thigh posterior calf lateral calf into the 

ankle tingling stabbing sharp and shooting radiating can be constant severe with

weightbearing walking and standing patient reports it wakes him from sleep 

occasionally but not most nights better with sitting or laying down patient 

reports is an 8 on scale 10 is worse over the past week 6 on average 5 its least

is a 5 today patient reports no new motor or sensory deficits no bowel or 

bladder incontinence with significant traveling pain is more radicular in 

fashion than previously.





Physical Exam:


VS:


Blood pressure is 136/80 pulse 69, temperature is 98.6, Height is 5 feet 7 

inches weight is 209 pounds


PE:


PHYSICAL EXAMINATION:





GENERAL: The patient is awake, alert, oriented, appropriate, very pleasant in 

demeanor, patient companied by his spouse.


HEENT: Shows normocephalic, atraumatic.  Extraocular movements are intact and 

symmetrical.  Oral cavity: Mucous membranes moist and pink. 


NECK: Shows anterior throat supple without palpable lymphadenopathy noted.  

Swallow reflex symmetrical.


CHEST: Shows normal on inspection.  Breath sounds are clear bilaterally, distant

but no rales or rhonchi.


HEART: Shows S1, S2 clear.  No murmurs auscultated.


ABDOMEN: Soft, nontender, nondistended, obese.  No palpable organomegaly is 

noted.  No rebound or guarding demonstrated.


BACK: Shows spine grossly in the midline.  Normal-appearing cervical lordotic 

curvature.  There is slightly increased thoracic kyphosis, some minor flattening

of the lumbar lordotic curvature.  Well-healed surgical scarring is noted and 

easily palpable spinal cord stimulator generator right of midline.  Lumbar 

paraspinous muscles show symmetrical on inspection, on palpation shows some 

moderate tenderness diffusely throughout the upper, middle and lower 

distribution of the paraspinous muscles without specific trigger points, without

radiation of pain.  The patient has good rotational motion of the lumbar spine, 

both laterally as well as extension and flexion without significant difficulty. 

No tenderness over the spinous processes, sacrum or sacroiliac regions.


EXTREMITIES: Lower extremities show deep tendon reflexes 1+ in the patellar and 

tendo calcaneus tendons.  Motor exam is 5 on a scale of 5 with right 

dorsiflexion, extension, quadriceps and hamstring flexion and 5/5 on the left.  

Peripheral pulses are 1 posterior tibial.  No peripheral edema is noted 

bilaterally.  Lower extremities are warm and dry.


SKIN: Shows warm and dry, good turgor.  No edema.  No sores, rashes or bruising 

throughout.





Procedure:


Procedure:


Options were discussed with the patient.  Patient's old chart was reviewed his 

his current medication regimen updated current review of systems updated today 

as well.  We will proceed with a left-sided transforaminal epidural steroid 

injection at the L5-S1 level.  Risks were discussed including but not limited 

to: Bleeding, infection, possibility of epidural hematoma and subsequent 

neurological compromise, dural puncture, headaches, spinal cord and/or nerve 

damage, potential injection of the vertebral arteries at level and permanent 

ischemic damage, side effects of steroid medication, and poor results regarding 

pain control.  Patient understands and wished to proceed.  Patient will return 

to the clinic in approximate 2 weeks for follow-up, was counseled as to return 

appointment activity level and side effects to be aware of.  Also discussed 

contacting patient's spinal cord stimulator representative for reprogramming 

after injection today.





Medication Injected:


Med Injected:


Under sterile prep and drape patient was placed in prone position using C-arm 

fluoroscopic guidance to identify the L5-S1 distribution oblique and slightly 

cephalad angled C arm.  The left L5-S1 target was identified and using lidocaine

for anesthetizing the skin 22-gauge Jackie pencil point needle was then used 

to enter the skin and into the subcutaneous tissues using direct C-arm 

fluoroscopic guidance to guide the needle into the transforaminal aspect of the 

left L5-S1 vertebrae this was confirmed with lateral views showing the needle 

tip in the superior aspect of the paravertebral region.  Aspiration was noted to

be negative, -1.5 cc of contrast was then injected with good spread both 

medially into the epidural space as well as laterally along the nerve root 

without uptake and without distribution and uptake on digital subtraction.  At 

this time, a solution containing 2 cc of 0.25% bupivacaine and 80 mg of Depo-

Medrol was then injected.  Needle was withdrawn and sterile bandage was applied.

 Patient tolerated procedure well had no immediate complications





Condition at Discharge:


Condition at Discharge:


Condition at discharge is stable, patient already procedure well and had no 

complications.











JEN CASTRO MD               Jun 28, 2021 11:36

## 2021-08-10 NOTE — PDOC
Progress Note - Pain Clinic


Date of Service:


DOS:


DATE: 8/10/21 


TIME: 10:26





Diagnosis:


Dx:


Lumbar radiculopathy with lumbar degenerative disease lumbar spinal stenosis 

with lumbar postlaminectomy syndrome with spinal cord stimulation


Right ischial bursitis


Left greater trochanteric bursitis


Left piriformis syndrome





History or Present Illness:


HPI:


65-year-old male returns for follow-up status post transforaminal injection left

L5-S1 with 100% improvement for the first few days but the pain returned fairly 

quickly in the leg patient has had reprogramming with his spinal cord stimulator

most recently with good report of decreased pain patient reports approximately 

85% improvement in the left leg itself but his chief complaint is left lateral 

hip pain.  Patient reports his pain in the lateral aspect of the hip and upper 

thigh on the left side without significant radiation occasionally left lateral 

leg to the knee but mostly in the hip itself and the lateral aspect of the upper

thigh patient reports is a 9 on scale 10 is worse over the past week 7 on 

average 6 its least and is a 7 today worse with walking standing changing 

position especially getting up from a seated position putting weight on the left

side and ambulating patient describes as aching and sharp stabbing at times r

adiating can be constant and severe on the left side as well.  Patient reports 

no loss of motor function but is weaning from sleep about every 5 hours or so.





Physical Exam:


VS:


Blood pressure is 135/81 pulse 71 respirations 18 temperature is 98.2 F height 

is 5 feet 7 inches weight is 212 pounds


PE:


PHYSICAL EXAMINATION:





GENERAL: The patient is awake, alert, oriented, appropriate, very pleasant in 

demeanor.


HEENT: Shows normocephalic, atraumatic.  Extraocular movements are intact and 

symmetrical.  Oral cavity: Mucous membranes moist and pink.  


NECK: Shows anterior throat supple without palpable lymphadenopathy noted.  

Swallow reflex symmetrical.


CHEST: Shows normal on inspection.  Breath sounds are clear bilaterally, distant

but no rales or rhonchi.


HEART: Shows S1, S2 clear.  No murmurs auscultated.


ABDOMEN: Soft, nontender, nondistended, obese.  No palpable organomegaly is 

noted.  


BACK: Shows spine grossly in the midline.  Normal-appearing cervical lordotic 

curvature.  There is slightly increased thoracic kyphosis, some minor flattening

of the lumbar lordotic curvature.  Well-healed surgical scarring is noted and 

easily palpable spinal cord stimulator generator is again noted.  Lumbar 

paraspinous muscles show symmetrical on inspection, on palpation shows some 

moderate tenderness diffusely throughout the upper, middle and lower 

distribution of the paraspinous muscles without specific trigger points, without

radiation of pain.  The patient has good rotational motion of the lumbar spine, 

both laterally as well as extension and flexion without significant difficulty. 

No tenderness over the spinous processes, sacrum or sacroiliac regions.


EXTREMITIES: Lower extremities show deep tendon reflexes 1+ in the patellar and 

tendo calcaneus tendons.  Motor exam is 5 on a scale of 5 with right 

dorsiflexion, extension, quadriceps and hamstring flexion and 5/5 on the left.  

Peripheral pulses are 1 posterior tibial.  No peripheral edema is noted 

bilaterally.  Lower extremities are warm and dry to touch, equal in color and 

appearance.  Patient's left leg shows significant tenderness over the left 

greater trochanter especially the superior aspect of this with very significant 

pain patient withdrawing from the examining hand with even moderate palpation.  

Right side is nontender.


SKIN: Shows warm and dry, good turgor.  No edema.  No sores, rashes or bruising 

throughout.





Procedure:


Procedure:


Options were discussed with the patient.  Patient's old chart was used his 

current medication regimen updated current review of systems updated today as 

well.  We will proceed with a left greater trochanteric bursa injections today 

with fluoroscopic guidance.  Risks were discussed including but not limited to 

bleeding infection possibility of intravascular injection sequelae spread of 

local anesthetic numbness side effects steroid medications post nephroscopy and 

poor results regarding pain control.  Patient understands wished to proceed.  

Patient return to clinic in approximately 2 weeks for follow-up, was counseled 

as to return appointment active level and side effects to wear.  We will also 

order MRI scan lumbar spine to better differentiate the persistent radiculopathy

in the left lower extremity.





Medication Injected:


Med Injected:


Under sterile prep and drape patient in supine position using C-arm fluoroscopic

guidance patient's left greater trochanter was visualized.  Using 25-gauge 

needle and 1% lidocaine the area lateral to the greater trochanter was 

anesthetized.  Using a 25-gauge needle under direct fluoroscopic visualization 

the greater trochanteric bursa was contacted with negative aspiration noted.  

1.5 cc of contrast was then injected with good local spread at the greater 

trochanteric bursa without washout.  At this time, a solution containing 3 cc of

0.25% bupivacaine and 80 mg Depo-Medrol was then injected into the greater 

trochanteric bursa.  Patient tolerated the procedure well and had no 

complications.





Condition at Discharge:


Condition at Discharge:


Condition at discharge stable, patient alert the procedure well and had no 

complications.











JEN CASTRO MD               Aug 10, 2021 10:34

## 2021-08-10 NOTE — PDOC4
Procedure Note:


ICD 10 Code:


ICD 10 Code:


M70.62





Procedure Note:


Patient was consented for left greater trochanteric bursa injection with 

fluoroscopic guidance.  Risk were discussed including but not limited to 

bleeding infection possibility of intravascular injection sequelae spread local 

anesthetic numbness side effects of steroid medication exposure to fluoroscopy 

and portals regarding pain control.  Patient understands and wishes to proceed.


Under sterile prep and drape patient in supine position using C-arm fluoroscopic

guidance patient's left greater trochanter was visualized.  Using 25-gauge 

needle and 1% lidocaine the area lateral to the greater trochanter was 

anesthetized.  Using a 25-gauge needle under direct fluoroscopic visualization 

the greater trochanteric bursa was contacted with negative aspiration noted.  

1.5 cc of contrast was then injected with good local spread at the greater 

trochanteric bursa without washout.  At this time, a solution containing 3 cc of

0.25% bupivacaine and 80 mg Depo-Medrol was then injected into the greater 

trochanteric bursa.  Patient tolerated the procedure well and had no 

complications.











JEN CASTRO MD               Aug 10, 2021 10:35

## 2021-08-13 NOTE — RAD
MRI LUMBAR SPINE WITHOUT AND WITH IV CONTRAST



History: Reason: left lumbar RADICULOPATHY   19mL CLARISCAN / Spl. Instructions:  / History: SCS NEVR
O    SUELLEN<0.4W/kg



Technique: Multiplanar, multi sequential MR imaging was performed of the lumbar spine without and wit
h intravenous contrast.



Comparison: January 18, 2019



Findings: 

Posterior stabilization L4-L5. Grade 1 anterolisthesis L4 on L5, unchanged. Intervertebral fusion L4-
L5. Mild retrolisthesis L2 on L3 and L5 on S1, unchanged. No acute fracture. Susceptibility artifact 
from spinal catheter also partially imaged.



Conus terminates at the normal location. No evidence of nerve root clumping. Noted definite pathologi
c enhancement although evaluation is degraded due to susceptibility artifact from hardware.



Partially imaged left renal cyst.



L1-L2:  Small disc bulge. No canal or neuroforaminal narrowing.



L2-L3:  Retrolisthesis. Broad-based disc bulge. Mild canal narrowing. Moderate subarticular recess. M
oderate facet arthropathy. Unchanged compared to prior. Postoperative changes right hemilaminectomy. 
Moderate right and mild left neuroforaminal narrowing, unchanged.



L3-L4:  Posterior disc osteophyte complex. Mild canal narrowing. Subarticular recess narrowing. Moder
ate facet arthropathy. Mild to moderate bilateral neuroforaminal narrowing.



L4-L5:  Intervertebral fusion. Anterolisthesis. Right hemilaminectomy. No canal narrowing. Severe lef
t and mild right neuroforaminal narrowing.



L5-S1:  Small disc bulge. No canal narrowing. Mild facet arthropathy. Moderate left and mild right ne
uroforaminal narrowing, unchanged.



Impression: 

1.  Moderate multilevel lumbar spondylosis, similar compared to prior.

2.  Mild canal narrowing L2-L3 and L3-L4, unchanged.

3.  Neuroforaminal narrowing most prominent left L4-5 and left L5-S1 as well as right L2-L3, similar 
compared to prior.

4.  Posterior changes of the lumbar spine with posterior stabilization L4-5, unchanged.



Electronically signed by: Tip Luna DO (8/13/2021 11:29 AM) [id number]

## 2021-08-24 NOTE — PDOC
Progress Note - Pain Clinic


Date of Service:


DOS:


DATE: 8/24/21 


TIME: 14:46





Diagnosis:


Dx:


Lumbar to colopathy with lumbar degenerative disease and lumbar spinal stenosis 

with post lumbar laminectomy syndrome and spinal cord stimulator


Left ischial bursitis


Left greater trochanteric bursitis


Left piriformis syndrome





History or Present Illness:


HPI:


65-year-old male returns for follow-up status post left greater trochanteric 

bursa injection with about 40% improvement overall.  Patient reports his pain 

now is new and is changed and is in the posterior aspect of the left hip and 

gluteus worse with sitting and worse with standing from a seated position and 

changing positions.  Patient reports he is recently had spinal cord stimulator 

reprogrammed with sciatic pain dramatically improved in his left leg however 

this new pain is much more painful with moving and changing positions.  Patient 

reports is a 7 on scale 10 is worse over the past week 5 on average 5 its least 

and is a 5 today.  Patient reports no significant radiation of pain describes 

pain as aching and sharp can be constant at times as well in the left posterior 

gluteus especially when sitting and he finds himself sitting leaning onto his 

right side to release pressure on the left side.  Patient reports no new motor 

or sensory deficits no new bowel or bladder incontinence.





Physical Exam:


VS:


Blood pressure is 119/78 pulse 77 respirations 18 temperature 98.7 F height is 

5 foot 7-1/2 inches weight is 204 pounds


PE:


PHYSICAL EXAMINATION:





GENERAL: The patient is awake, alert, oriented, appropriate, very pleasant in 

demeanor


HEENT: Shows normocephalic, atraumatic.  Extraocular movements are intact and 

symmetrical.  Oral cavity: Mucous membranes moist and pink.


NECK: Shows anterior throat supple without palpable lymphadenopathy noted.  

Swallow reflex symmetrical.


CHEST: Shows normal on inspection.  Breath sounds are clear bilaterally, distant

but no rales or rhonchi.


HEART: Shows S1, S2 clear.  No murmurs auscultated.


ABDOMEN: Soft, nontender, nondistended, obese.  No palpable organomegaly is 

noted.


BACK: Shows spine grossly in the midline.  Normal-appearing cervical lordotic 

curvature.  There is increased thoracic kyphosis, some flattening of the lumbar 

lordotic curvature.  Lumbar paraspinous muscles show symmetrical on inspection, 

on palpation shows some moderate tenderness diffusely throughout the upper, 

middle and lower distribution of the paraspinous muscles without specific 

trigger points, without radiation of pain.  The patient has good rotational 

motion of the lumbar spine, both laterally as well as extension and flexion 

without significant difficulty.  No tenderness over the spinous processes, 

sacrum or sacroiliac regions.  Patient has significant tenderness over the left 

posterior aspect of the lateral medial gluteus over the ischial tuberosity with 

direct palpation very tender significant pain but without radiation right side 

is nontender.


EXTREMITIES: Lower extremities show deep tendon reflexes 1+ in the patellar and 

tendo calcaneus tendons.  Motor exam is 5 on a scale of 5 with right 

dorsiflexion, extension, quadriceps and hamstring flexion and 5/5 on the left.  

Peripheral pulses are 1+ posterior tibial.  No peripheral edema is noted 

bilaterally.  Lower extremities are warm and dry to touch, equal in color and 

appearance. 


SKIN: Shows warm and dry, good turgor.  No edema.  No sores, rashes or bruising 

throughout.





Procedure:


Procedure:


Options were discussed with the patient.  Patient's old chart was reviewed as 

his current medication regimen updated current review of systems updated today 

as well.  We will proceed with a left ischial tuberosity bursa injection today 

with fluoroscopic guidance.  Risk for discussed including but not limited to 

bleeding infection possibility of intravascular injection sequelae spread of 

local anesthetic numbness side effects steroid medication exposure fluoroscopy 

and poor results regarding pain control.  Patient understands wished to proceed.

 Patient return to the clinic in approximately 4 weeks for follow-up, was 

counseled as to return appointment activity level and side effects to be aware 

of.





Medication Injected:


Med Injected:


Patient in the right lateral decubitus position under sterile prep and drape 

using C-arm fluoroscopic guidance patient's left ischial tuberosity inferior 

aspect was identified and using local anesthetic area over the skin was 

anesthetized using a 25-gauge needle was entered under direct fluoroscopic 

visualization to contact to the inferior aspect of the left ischial tuberosity. 

This time negative aspiration was noted and 1 cc of contrast was injected with 

good spread at the level of the ischial tuberosity without washout or uptake.  

At this time solution containing 3 cc of 0.25% bupivacaine and 80 mg Depo-Medrol

was then injected.  Needle withdrawn and sterile bandage was applied.  Patient 

tolerated procedure well and had no complications.





Condition at Discharge:


Condition at Discharge:


Condition at discharge stable, patient already procedure well and had no 

complications.











JEN CASTRO MD               Aug 24, 2021 14:51

## 2022-04-05 ENCOUNTER — HOSPITAL ENCOUNTER (OUTPATIENT)
Dept: HOSPITAL 61 - PNCL | Age: 67
Discharge: HOME | End: 2022-04-05
Attending: ANESTHESIOLOGY
Payer: MEDICARE

## 2022-04-05 DIAGNOSIS — Z98.890: ICD-10-CM

## 2022-04-05 DIAGNOSIS — E66.9: ICD-10-CM

## 2022-04-05 DIAGNOSIS — G57.02: ICD-10-CM

## 2022-04-05 DIAGNOSIS — F17.210: ICD-10-CM

## 2022-04-05 DIAGNOSIS — Z79.84: ICD-10-CM

## 2022-04-05 DIAGNOSIS — E11.9: ICD-10-CM

## 2022-04-05 DIAGNOSIS — Z79.899: ICD-10-CM

## 2022-04-05 DIAGNOSIS — M51.16: ICD-10-CM

## 2022-04-05 DIAGNOSIS — M48.061: ICD-10-CM

## 2022-04-05 DIAGNOSIS — E78.00: ICD-10-CM

## 2022-04-05 DIAGNOSIS — M96.1: ICD-10-CM

## 2022-04-05 DIAGNOSIS — I25.10: ICD-10-CM

## 2022-04-05 DIAGNOSIS — K21.9: ICD-10-CM

## 2022-04-05 DIAGNOSIS — I10: ICD-10-CM

## 2022-04-05 DIAGNOSIS — Z85.828: ICD-10-CM

## 2022-04-05 DIAGNOSIS — M19.90: ICD-10-CM

## 2022-04-05 DIAGNOSIS — M70.61: Primary | ICD-10-CM

## 2022-04-05 DIAGNOSIS — Z88.6: ICD-10-CM

## 2022-04-05 PROCEDURE — 20610 DRAIN/INJ JOINT/BURSA W/O US: CPT

## 2022-04-05 PROCEDURE — 77002 NEEDLE LOCALIZATION BY XRAY: CPT

## 2022-04-05 NOTE — PDOC
Progress Note - Pain Clinic


Date of Service:


DOS:


DATE: 4/5/22 


TIME: 09:58





Diagnosis:


Dx:


Lumbar radiculopathy with lumbar degenerative disease lumbar spinal stenosis and

lumbar postlaminectomy syndrome with spinal cord stimulator


Left ischial bursitis


Left greater trochanteric bursitis


Right greater trochanteric bursitis


Left piriformis syndrome





History or Present Illness:


HPI:


66-year-old male returns last seen August 2021 patient had some left ischial 

tuberosity pain and did well after greater trochanteric injection and ischial 

tuberosity injection patient reports now he was doing very well and still 

getting very good excellent coverage with his spinal cord stimulator in the 

lower extremities but has pain in the right hip now after recent move to new 

home with a lot of packing and packing activity on his feet bending stooping 

lifting patient report is on the right side now which is always been on the left

in the past patient reports is in the right lateral aspect of the hip and the 

posterior hip to moderate extent patient rates his pain is 8 on scale 10 is 

worse over the past week 7 on average 5 its least is a 7 today describes aching 

and sharp burning and stabbing in the right lateral hip and into the lateral 

thigh occasionally into the anterior thigh radiating and can be constant with 

weightbearing change positions getting up from 6 sitting position walking and 

stepping to his right side.  Patient reports no loss of motor function no bowel 

or bladder incontinence.  Patient has been taking oral analgesics as well as 

using topical ointment without significant reduction in pain as well as 

stretching and trying to keep the leg mobile with chiropractic treatment which 

is not decreasing the pain significantly.





Physical Exam:


VS:


Blood pressure is 107/74 pulse 67 respirations 18 temperature 97.7 F height 5 

feet 7.5 inches weight is 212 pounds.


PE:


PHYSICAL EXAMINATION:





GENERAL: The patient is awake, alert, oriented, appropriate, very pleasant in 

demeanor


HEENT: Shows normocephalic, atraumatic.  Extraocular movements are intact and 

symmetrical.  Oral cavity: Mucous membranes moist and pink.  Dentition is 

intact.


NECK: Shows anterior throat supple without palpable lymphadenopathy noted.  

Swallow reflex symmetrical.


CHEST: Shows normal on inspection.  Breath sounds are clear bilaterally, distant

but no rales rhonchi or wheezes auscultated.


HEART: Shows S1, S2 clear.  No murmurs auscultated.


ABDOMEN: Soft, nontender, nondistended.  No palpable organomegaly is noted. 


BACK: Shows spine grossly in the midline.  Normal-appearing cervical lordotic 

curvature.  There is moderately increased thoracic kyphosis, some minor 

flattening of the lumbar lordotic curvature, well-healed surgical scar noted as 

well as easily palpable spinal cord stimulator generator.  Lumbar paraspinous 

muscles show symmetrical on inspection, on palpation shows some moderate 

tenderness diffusely throughout the upper, middle and lower distribution of the 

paraspinous muscles without specific trigger points, without radiation of pain. 

The patient has good rotational motion of the lumbar spine, both laterally as 

well as extension and flexion without significant difficulty.  No tenderness 

over the spinous processes, sacrum or sacroiliac regions.


EXTREMITIES: Lower extremities show deep tendon reflexes 1+ in the patellar and 

tendo calcaneus tendons.  Motor exam is 5 on a scale of 5 with right sayra

siflexion, extension, quadriceps and hamstring flexion and 5/5 on the left.  

Peripheral pulses are 1+ posterior tibial.  No peripheral edema is noted 

bilaterally.  Patient's right hip shows significant tenderness over the right 

greater trochanter very severe tenderness with moderate palpation as well as 

radiation to the lateral thigh with palpation left side is only very minimally 

tender with palpation.  Lower extremities are warm and dry to touch, equal in 

color and appearance. 


SKIN: Shows warm and dry, good turgor.  No edema.  No sores, rashes or bruising 

throughout.





Procedure:


Procedure:


Options were discussed with patient.  Patient's old chart was reviewed his 

current medication regimen updated current review of systems updated today as 

well.  We will proceed with a right greater trochanteric bursa injection with 

fluoroscopic guidance.  Risk were discussed including not limited to bleeding 

infection possibility of intravascular injection sequelae spread of local 

anesthetic and numbness side effects steroid medication exposure fluoroscopy and

poor results regarding pain control.  Patient understands wished to proceed.  

Patient return to clinic in approximately 2 weeks for follow-up, was counseled 

as to return appointment, activity level, and side effect to be aware of.





Medication Injected:


Med Injected:


Under sterile prep and drape patient in supine position using C-arm fluoroscopic

guidance patient's right greater trochanteric bursa was visualized.  Using 25-

gauge needle and 1% lidocaine area lateral to the greater trochanter was 

anesthetized.  Using a 25-gauge needle under direct fluoroscopic visualization 

the greater trochanteric bursa was contacted with negative aspiration noted.  

1.5 cc of contrast was then injected with good local spread at the greater 

trochanteric bursa without washout.  At this time, a solution containing 3 cc of

0.25% bupivacaine and 15 mg dexamethasone was then injected into the greater 

trochanteric bursa.  Patient tolerated the procedure well and had no 

complications.





Condition at Discharge:


Condition at Discharge:


Condition at discharge stable, patient tolerated the procedure well and had no 

palpitations.











JEN CASTRO MD                Apr 5, 2022 10:03
Dr. Chavez Santos

## 2022-04-05 NOTE — PDOC4
Procedure Note:


ICD 10 Code:


ICD 10 Code:


M70.61





Procedure Note:


Patient was consented for right greater trochanteric bursa injection with 

fluoroscopic guidance.  Risks were discussed including not limited to bleeding 

infection possibility of intravascular injection sequelae spread of local 

anesthetic and numbness side effects steroid medication exposure fluoroscopy and

poor results regarding pain control.  Patient understands wished to proceed.


Under sterile prep and drape patient in supine position using C-arm fluoroscopic

guidance patient's right greater trochanteric bursa was visualized.  Using 25-

gauge needle and 1% lidocaine area lateral to the greater trochanter was 

anesthetized.  Using a 25-gauge needle under direct fluoroscopic visualization 

the greater trochanteric bursa was contacted with negative aspiration noted.  

1.5 cc of contrast was then injected with good local spread at the greater 

trochanteric bursa without washout.  At this time, a solution containing 3 cc of

0.25% bupivacaine and 15 mg dexamethasone was then injected into the greater 

trochanteric bursa.  Patient tolerated the procedure well and had no 

complications.











JEN CASTRO MD                Apr 5, 2022 10:04

## 2022-05-14 ENCOUNTER — HOSPITAL ENCOUNTER (EMERGENCY)
Dept: HOSPITAL 61 - ER | Age: 67
Discharge: HOME | End: 2022-05-14
Payer: MEDICARE

## 2022-05-14 VITALS
SYSTOLIC BLOOD PRESSURE: 113 MMHG | DIASTOLIC BLOOD PRESSURE: 68 MMHG | SYSTOLIC BLOOD PRESSURE: 113 MMHG | DIASTOLIC BLOOD PRESSURE: 68 MMHG

## 2022-05-14 VITALS — HEIGHT: 67 IN | BODY MASS INDEX: 34.26 KG/M2 | WEIGHT: 218.26 LBS

## 2022-05-14 DIAGNOSIS — M54.6: Primary | ICD-10-CM

## 2022-05-14 DIAGNOSIS — Z88.5: ICD-10-CM

## 2022-05-14 DIAGNOSIS — F17.200: ICD-10-CM

## 2022-05-14 PROCEDURE — 71250 CT THORAX DX C-: CPT

## 2022-05-14 PROCEDURE — 99285 EMERGENCY DEPT VISIT HI MDM: CPT

## 2022-05-14 PROCEDURE — 74176 CT ABD & PELVIS W/O CONTRAST: CPT

## 2022-05-14 PROCEDURE — 96372 THER/PROPH/DIAG INJ SC/IM: CPT
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
## 2022-05-26 ENCOUNTER — HOSPITAL ENCOUNTER (OUTPATIENT)
Dept: HOSPITAL 61 - KCIC | Age: 67
End: 2022-05-26
Attending: NEUROLOGICAL SURGERY
Payer: MEDICARE

## 2022-05-26 DIAGNOSIS — S22.061D: Primary | ICD-10-CM
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
## 2022-05-26 DIAGNOSIS — M47.812: ICD-10-CM

## 2022-05-26 DIAGNOSIS — Z98.890: ICD-10-CM

## 2022-05-26 PROCEDURE — 72072 X-RAY EXAM THORAC SPINE 3VWS: CPT

## 2022-05-27 NOTE — [facility]
XR THORACIC SPINE 3VIEWS



History: Reason: T8 fracture follow up. / Spl. Instructions:  / History: 



Technique: 3 views thoracic spine.



Comparison: CT May 14, 2022



Findings:

Spinal stimulator leads noted. Nondisplaced fracture of the anterior T8 vertebral body is not well ch
aracterized on radiographs. No increased vertebral body height loss. Mild thoracic degenerative disc 
changes. Additionally changes throughout the thoracic spine. Postop changes cervical spine.



Impression: 

1.  T8 nondisplaced anterior vertebral body fracture is not well characterized on radiographs.

2.  Mild thoracic spondylosis with diffuse DISH related changes.



Electronically signed by: Tip Luna DO (5/27/2022 3:09 PM) [id number]

## 2023-10-01 NOTE — PHYS DOC
Past Medical History


Additional Past Medical Histor:  SKIN CANCER,


Past Surgical History:  Knee Replacement


Additional Past Surgical Histo:  NEURO STIMULATOR IN BACK RIGHT SIDE


Smoking Status:  Current Every Day Smoker


Additional Information:  


1 PK/DAY


Alcohol Use:  Occasionally





General Adult


EDM:


Chief Complaint:  BACK PAIN OR INJURY





HPI:


HPI:





Patient is a 66-year-old male who presents today with left upper back pain.  

Patient states he has a lengthy medical history in regards to having chronic 

back pain including multiple back surgeries as well as a spinal cord stimulator,

patient states she injured his back today he was moving some pavers with a cart 

and the cart was going downhill and he said the cart kind of got away from him 

and it hit him in the back.  Patient states he did not fall he did not have a 

loss of consciousness but his back just hurts.  Patient denies chest pain, 

shortness of breath, patient has multiple scars on his back with multiple 

surgeries he has no neck pain or head pain.





Review of Systems:


Review of Systems:


Constitutional:   Denies fever or chills. []


Eyes:   Denies change in visual acuity. []


HENT:   Denies nasal congestion or sore throat. [] 


Respiratory:   Denies cough or shortness of breath. [] 


Cardiovascular:   Denies chest pain or edema. [] 


GI:   Denies abdominal pain, nausea, vomiting, bloody stools or diarrhea. [] 


:  Denies dysuria. [] 


Musculoskeletal:   Left upper back/chest pain denies back pain or joint pain. []

 


Integument:   Denies rash. [] 


Neurologic:   Denies headache, focal weakness or sensory changes. [] 


Endocrine:   Denies polyuria or polydipsia. [] 


Lymphatic:  Denies swollen glands. [] 


Psychiatric:  Denies depression or anxiety. []





Heart Score:


C/O Chest Pain:  No


Risk Factors:


Risk Factors:  DM, Current or recent (<one month) smoker, HTN, HLP, family 

history of CAD, obesity.


Risk Scores:


Score 0 - 3:  2.5% MACE over next 6 weeks - Discharge Home


Score 4 - 6:  20.3% MACE over next 6 weeks - Admit for Clinical Observation


Score 7 - 10:  72.7% MACE over next 6 weeks - Early Invasive Strategies





Current Medications:





Current Medications








 Medications


  (Trade)  Dose


 Ordered  Sig/Jose  Start Time


 Stop Time Status Last Admin


Dose Admin


 


 Ketorolac


 Tromethamine


  (Toradol Im)  60 mg  1X  ONCE  5/14/22 19:00


 5/14/22 19:01 DC 5/14/22 18:45


60 MG


 


 Orphenadrine


 Citrate


  (Norflex)  60 mg  1X  ONCE  5/14/22 19:00


 5/14/22 19:01 DC 5/14/22 18:46


60 MG











Allergies:


Allergies:





Allergies








Coded Allergies Type Severity Reaction Last Updated Verified


 


  morphine Allergy Intermediate Hives 5/14/22 Yes











Physical Exam:


PE:





Constitutional: Well developed, well nourished, mild distress, non-toxic 

appearance. []


HENT: Normocephalic, atraumatic, bilateral external ears normal, oropharynx 

moist, no oral exudates, nose normal. []


Eyes: PERRLA, EOMI, conjunctiva normal, no discharge. [] 


Neck: Normal range of motion, no tenderness, supple, no stridor. [] 


Cardiovascular:Heart rate regular rhythm, no murmur []


Lungs & Thorax:  Bilateral breath sounds clear to auscultation []


Abdomen: Bowel sounds normal, soft, no tenderness, no masses, no pulsatile 

masses. [] 


Skin: Warm, dry, no erythema, no rash. [] 


Back: Healed surgical incision in the lumbar area as well is a healed surgical 

scar to the left of the spinal area near L4-L5, thoracic tenderness located in 

between the shoulder blades, no lacerations, abrasions, contusions, or ecchymosi

s noted no step-offs or crepitus noted 


Extremities: No tenderness, no cyanosis, no clubbing, ROM intact, no edema. [] 


Neurologic: Alert and oriented X 3, normal motor function, normal sensory 

function, no focal deficits noted. []


Psychologic: Affect normal, judgement normal, mood normal. []





Current Patient Data:


Vital Signs:





Vital Signs








  Date Time  Temp Pulse Resp B/P (MAP) Pulse Ox O2 Delivery O2 Flow Rate FiO2


 


5/14/22 18:17 98.9 76 23 139/71 (93) 97 Room Air  





 98.9       








                                   Vital Signs








  Date Time  Temp Pulse Resp B/P (MAP) Pulse Ox O2 Delivery O2 Flow Rate FiO2


 


5/14/22 18:17 98.9 76 23 139/71 (93) 97 Room Air  





 98.9       











EKG:


EKG:


[]





Radiology/Procedures:


Radiology/Procedures:


REASON: hit in back with cart, hx of back surgery and SCS


PROCEDURE: CT CHEST ABDOMEN PELVIS WO





Examination: CT chest abdomen pelvis without contrast





HISTORY: History of injury , hit with a cart in the back





COMPARISON: None available





TECHNIQUE: Axial CT images of the chest abdomen pelvis are performed without 

contrast. Coronal and sagittal reformats were performed. Exposure: One or more 

of the following individualized dose reduction techniques were utilized for this

 examination:  1. Automated exposure control  2. Adjustment of the mA and/or kV 

according to patient size  3. Use of iterative reconstruction technique





FINDINGS:





The central airways are patent.





Coronary artery calcifications. The ascending aorta measures 4.1 cm in 

transverse dimension. Mild bibasilar lung atelectasis





The evaluation of the solid organs is limited due to lack of IV contrast. The 

evaluation of bowel is limited due to lack of oral contrast. No evidence of free

 air identified in the abdomen. The visualized noncontrasted liver, spleen, 

adrenals grossly appears unremarkable. Gallbladder is mildly distended.





Surgical changes identified in stomach. The small bowel is nondilated. Feces and

 gas noted in the colon.





Multiple sigmoid colon diverticulosis. Fat-containing umbilical hernia measuring

 5.7 cm in AP dimension.











Urinary bladder is mildly distended





No evidence of intrarenal collecting system calculi or hydronephrosis. Cystic 

structure identified in the left kidney measuring 8.2 cm probably cyst. However 

evaluation limited lack of IV contrast.





Nondisplaced fracture of the anterior vertebral body of T8 involving. Bilateral 

facets are well aligned. Surgical changes with hardware identified at L4-L5 

vertebral level. Moderate to severe degenerative changes thoracic and lumbar 

spine.





IMPRESSION:


1.  Nondisplaced fracture of the anterior vertebral body of T8.


2.  8.2 cm cystic structure identified in the left kidney probably cyst. However

 evaluation limited lack of IV contrast.


3.  Coronary artery calcifications.


4.  Mild aneurysmal change ascending aorta.


5.  Multiple sigmoid colon diverticulosis.





Electronically signed by: Alberto Moran MD (5/14/2022 7:30 PM) [id number][]





Course & Med Decision Making:


Course & Med Decision Making


Pertinent Labs and Imaging studies reviewed. (See chart for details)





1959 I reviewed radiological results with the daughter and the patient the 

daughter states that she is a chiropractor and she is concerned with the spinal 

cord stimulator leads and she was wondering if the patient would need a back 

brace, and she is also concerned about the patient's bone density and the amount

 of back surgeries that has had she is requesting that I call Dr. Hoffman, the 

patient's neurosurgeon, I will page Dr. Hoffman at this time to review the case

 with him.





2030 I spoke with Dr. Hoffman's NP and she made the recommendation of 

discharging the patient home and to advise him to rest over the next 24 to 48 

hours, she will call him on Monday to get him a back brace and also to headsets 

his pain, she made recommendations to send the patient home with a Medrol 

Dosepak as well as Flexeril and some pain medication.  Patient states he does 

not have any Percocets at home but he has taken them in the past.  I did discuss

 the plan of care with the patient and his daughter and they are agreeable with 

outpatient management with the caveat that should his pain get worse over the 

next 24 hours that he should return here to the emergency department for 

admission.





Dragon Disclaimer:


Dragon Disclaimer:


This electronic medical record was generated, in whole or in part, using a voice

 recognition dictation system.





Departure


Departure


Impression:  


   Primary Impression:  


   Fracture of thoracic vertebra, closed


   Qualified Codes:  S22.068A - Other fracture of T7-t8 thoracic vertebra, 

   initial encounter for closed fracture


Disposition:  01 HOME / SELF CARE / HOMELESS


Condition:  STABLE


Referrals:  


REAL WHITE (PCP)








LEILA LAYNE MD


Patient Instructions:  Vertebral Fracture





Additional Instructions:  


Percocet take 1 to 2 tablets every 6 hours as needed for pain, use with caution 

may cause drowsiness do not operate heavy machinery while taking this 

medication, also use with caution may cause constipation


Flexeril take 1 tablet every 8 hours as needed for muscle spasms


Medrol Dosepak as directed


Ice to the affected area 20 minutes on 3-4 times daily to help with localized 

pain relief and swelling


Over-the-counter Lidoderm patches 1 to the affected area as directed on the 

package


Return here to the emergency department should you have increased pain that is 

not controlled with the Percocet Flexeril and the Medrol Dosepak


Follow-up on Monday with Denise Wasserman Dr.'s nurse practitioner 

states that she will call you on Monday for a update on your condition.


Scripts


Oxycodone/Apap  (PERCOCET  MG TABLET **) 1 Each Tablet


1 TAB PO PRN Q6HRS PRN for PAIN for 7 Days, #28 TAB 0 Refills


   Prov: MICHAEL SMITH APRN         5/14/22 


Cyclobenzaprine Hcl (CYCLOBENZAPRINE HCL) 10 Mg Tablet


10 MG PO TID PRN PRN for MUSCLE SPASMS, #30 TAB


   Prov: MICHAEL SMITH APRN         5/14/22 


Methylprednisolone (MEDROL) 4 Mg Tab.ds.pk


1 PKG PO UD, #1 PKG


   Prov: MICHAEL SMITH APRN         5/14/22











MICHAEL SMITH APRN       May 14, 2022 19:58 97.9